# Patient Record
Sex: FEMALE | Race: WHITE | NOT HISPANIC OR LATINO | Employment: UNEMPLOYED | URBAN - METROPOLITAN AREA
[De-identification: names, ages, dates, MRNs, and addresses within clinical notes are randomized per-mention and may not be internally consistent; named-entity substitution may affect disease eponyms.]

---

## 2021-07-23 ENCOUNTER — APPOINTMENT (OUTPATIENT)
Dept: GENERAL RADIOLOGY | Facility: HOSPITAL | Age: 36
End: 2021-07-23

## 2021-07-23 ENCOUNTER — HOSPITAL ENCOUNTER (EMERGENCY)
Facility: HOSPITAL | Age: 36
Discharge: HOME | End: 2021-07-23
Attending: EMERGENCY MEDICINE | Admitting: EMERGENCY MEDICINE

## 2021-07-23 VITALS
OXYGEN SATURATION: 100 % | DIASTOLIC BLOOD PRESSURE: 71 MMHG | BODY MASS INDEX: 32.17 KG/M2 | HEART RATE: 74 BPM | RESPIRATION RATE: 20 BRPM | SYSTOLIC BLOOD PRESSURE: 116 MMHG | TEMPERATURE: 98.4 F | HEIGHT: 66 IN | WEIGHT: 200.18 LBS

## 2021-07-23 DIAGNOSIS — R20.2 PARESTHESIA: Primary | ICD-10-CM

## 2021-07-23 LAB
ALBUMIN SERPL-MCNC: 3.9 G/DL (ref 3.5–5.2)
ALBUMIN/GLOB SERPL: 1.1 G/DL
ALP SERPL-CCNC: 72 U/L (ref 39–117)
ALT SERPL W P-5'-P-CCNC: 16 U/L (ref 1–33)
ANION GAP SERPL CALCULATED.3IONS-SCNC: 9.7 MMOL/L (ref 5–15)
AST SERPL-CCNC: 17 U/L (ref 1–32)
BASOPHILS # BLD AUTO: 0.04 10*3/MM3 (ref 0–0.2)
BASOPHILS NFR BLD AUTO: 0.4 % (ref 0–1.5)
BILIRUB SERPL-MCNC: 0.3 MG/DL (ref 0–1.2)
BUN SERPL-MCNC: 8 MG/DL (ref 6–20)
BUN/CREAT SERPL: 12.9 (ref 7–25)
CALCIUM SPEC-SCNC: 9.1 MG/DL (ref 8.6–10.5)
CHLORIDE SERPL-SCNC: 105 MMOL/L (ref 98–107)
CO2 SERPL-SCNC: 21.3 MMOL/L (ref 22–29)
CREAT SERPL-MCNC: 0.62 MG/DL (ref 0.57–1)
DEPRECATED RDW RBC AUTO: 42.5 FL (ref 37–54)
EOSINOPHIL # BLD AUTO: 0.3 10*3/MM3 (ref 0–0.4)
EOSINOPHIL NFR BLD AUTO: 3 % (ref 0.3–6.2)
ERYTHROCYTE [DISTWIDTH] IN BLOOD BY AUTOMATED COUNT: 12.4 % (ref 12.3–15.4)
GFR SERPL CREATININE-BSD FRML MDRD: 108 ML/MIN/1.73
GLOBULIN UR ELPH-MCNC: 3.5 GM/DL
GLUCOSE SERPL-MCNC: 102 MG/DL (ref 65–99)
HCT VFR BLD AUTO: 40 % (ref 34–46.6)
HGB BLD-MCNC: 13.9 G/DL (ref 12–15.9)
HOLD SPECIMEN: NORMAL
HOLD SPECIMEN: NORMAL
IMM GRANULOCYTES # BLD AUTO: 0.03 10*3/MM3 (ref 0–0.05)
IMM GRANULOCYTES NFR BLD AUTO: 0.3 % (ref 0–0.5)
LYMPHOCYTES # BLD AUTO: 1.75 10*3/MM3 (ref 0.7–3.1)
LYMPHOCYTES NFR BLD AUTO: 17.7 % (ref 19.6–45.3)
MCH RBC QN AUTO: 32.6 PG (ref 26.6–33)
MCHC RBC AUTO-ENTMCNC: 34.8 G/DL (ref 31.5–35.7)
MCV RBC AUTO: 93.7 FL (ref 79–97)
MONOCYTES # BLD AUTO: 0.67 10*3/MM3 (ref 0.1–0.9)
MONOCYTES NFR BLD AUTO: 6.8 % (ref 5–12)
NEUTROPHILS NFR BLD AUTO: 7.07 10*3/MM3 (ref 1.7–7)
NEUTROPHILS NFR BLD AUTO: 71.8 % (ref 42.7–76)
NRBC BLD AUTO-RTO: 0 /100 WBC (ref 0–0.2)
NT-PROBNP SERPL-MCNC: 71.2 PG/ML (ref 0–450)
PLATELET # BLD AUTO: 336 10*3/MM3 (ref 140–450)
PMV BLD AUTO: 8.5 FL (ref 6–12)
POTASSIUM SERPL-SCNC: 3.8 MMOL/L (ref 3.5–5.2)
PROT SERPL-MCNC: 7.4 G/DL (ref 6–8.5)
RBC # BLD AUTO: 4.27 10*6/MM3 (ref 3.77–5.28)
SODIUM SERPL-SCNC: 136 MMOL/L (ref 136–145)
TROPONIN T SERPL-MCNC: <0.01 NG/ML (ref 0–0.03)
WBC # BLD AUTO: 9.86 10*3/MM3 (ref 3.4–10.8)
WHOLE BLOOD HOLD SPECIMEN: NORMAL

## 2021-07-23 PROCEDURE — 71045 X-RAY EXAM CHEST 1 VIEW: CPT

## 2021-07-23 PROCEDURE — 93005 ELECTROCARDIOGRAM TRACING: CPT | Performed by: EMERGENCY MEDICINE

## 2021-07-23 PROCEDURE — 93010 ELECTROCARDIOGRAM REPORT: CPT | Performed by: INTERNAL MEDICINE

## 2021-07-23 PROCEDURE — 85025 COMPLETE CBC W/AUTO DIFF WBC: CPT | Performed by: EMERGENCY MEDICINE

## 2021-07-23 PROCEDURE — 99284 EMERGENCY DEPT VISIT MOD MDM: CPT

## 2021-07-23 PROCEDURE — 83880 ASSAY OF NATRIURETIC PEPTIDE: CPT | Performed by: EMERGENCY MEDICINE

## 2021-07-23 PROCEDURE — 80053 COMPREHEN METABOLIC PANEL: CPT | Performed by: EMERGENCY MEDICINE

## 2021-07-23 PROCEDURE — 84484 ASSAY OF TROPONIN QUANT: CPT | Performed by: EMERGENCY MEDICINE

## 2021-07-23 PROCEDURE — 96374 THER/PROPH/DIAG INJ IV PUSH: CPT

## 2021-07-23 RX ORDER — FAMOTIDINE 10 MG/ML
20 INJECTION, SOLUTION INTRAVENOUS ONCE
Status: COMPLETED | OUTPATIENT
Start: 2021-07-23 | End: 2021-07-23

## 2021-07-23 RX ORDER — FAMOTIDINE 40 MG/1
40 TABLET, FILM COATED ORAL DAILY
Qty: 10 TABLET | Refills: 0 | Status: SHIPPED | OUTPATIENT
Start: 2021-07-23 | End: 2021-09-03 | Stop reason: SDUPTHER

## 2021-07-23 RX ORDER — SODIUM CHLORIDE 0.9 % (FLUSH) 0.9 %
10 SYRINGE (ML) INJECTION AS NEEDED
Status: DISCONTINUED | OUTPATIENT
Start: 2021-07-23 | End: 2021-07-24 | Stop reason: HOSPADM

## 2021-07-23 RX ADMIN — FAMOTIDINE 20 MG: 10 INJECTION INTRAVENOUS at 22:42

## 2021-07-24 LAB — QT INTERVAL: 368 MS

## 2021-07-24 NOTE — ED PROVIDER NOTES
Time: 10:28 PM EDT  Arrived by: private car  Chief Complaint:   Chief Complaint   Patient presents with   • Shortness of Breath     shortness of air,light headedness and tingling for 2 weeks in all her extremitities, when she is standing or sitting a certain way     History provided by: Patient  History is limited by: N/A     History of Present Illness:  Patient is a 36 y.o. year old female that presents to the emergency department with chest pain and intermittent tingling    Patient states since she was treated for poison ivy with prednisone several weeks ago she began developing intermittent episodes of chest pain/tightness awakens her from sleep at night.  It occasionally radiates into her neck and describes tingling over her whole body that occurs intermittently.  Occasionally she has shortness of breath and mild abdominal pain.  She is currently asymptomatic.      History provided by:  Patient  Chest Pain  Pain location:  Substernal area and epigastric  Pain quality: aching    Pain radiates to:  Neck  Pain severity:  No pain  Onset quality:  Gradual  Timing:  Intermittent  Progression:  Unable to specify  Chronicity:  New  Context: at rest (awakens from sleep)    Relieved by:  Nothing  Worsened by:  Nothing  Ineffective treatments:  None tried  Associated symptoms: heartburn, nausea, numbness and vomiting    Associated symptoms: no abdominal pain, no cough, no fever, no headache and no shortness of breath        Similar Symptoms Previously: no  Recently seen: no      Patient Care Team  Primary Care Provider: Provider, No Known    Past Medical History:     No Known Allergies  History reviewed. No pertinent past medical history.  History reviewed. No pertinent surgical history.  History reviewed. No pertinent family history.    Home Medications:  Prior to Admission medications    Not on File        Social History:   Social History     Tobacco Use   • Smoking status: Never Smoker   • Smokeless tobacco: Never Used  "  Vaping Use   • Vaping Use: Some days   • Substances: Nicotine   • Devices: Disposable   Substance Use Topics   • Alcohol use: Yes     Comment: socially   • Drug use: Never     Recent travel: no     Record Review:  I have reviewed the patient's records in [facility].     Review of Systems:  Review of Systems   Constitutional: Negative for chills and fever.   HENT: Negative for congestion, ear pain and sore throat.    Eyes: Negative for pain.   Respiratory: Negative for cough, chest tightness and shortness of breath.    Cardiovascular: Positive for chest pain.   Gastrointestinal: Positive for heartburn, nausea and vomiting. Negative for abdominal pain and diarrhea.   Genitourinary: Negative for flank pain and hematuria.   Musculoskeletal: Negative for joint swelling.   Skin: Negative for pallor.   Neurological: Positive for numbness. Negative for seizures and headaches.   All other systems reviewed and are negative.       Physical Exam:  /77   Pulse 69   Temp 98.4 °F (36.9 °C) (Oral)   Resp 23   Ht 167.6 cm (66\")   Wt 90.8 kg (200 lb 2.8 oz)   LMP 06/30/2021 (Exact Date)   SpO2 99%   Breastfeeding No   BMI 32.31 kg/m²     Physical Exam  Vitals and nursing note reviewed.   Constitutional:       General: She is not in acute distress.     Appearance: Normal appearance. She is not toxic-appearing.   HENT:      Head: Normocephalic and atraumatic.      Jaw: There is normal jaw occlusion.   Eyes:      General: Lids are normal.      Extraocular Movements: Extraocular movements intact.      Conjunctiva/sclera: Conjunctivae normal.      Pupils: Pupils are equal, round, and reactive to light.   Cardiovascular:      Rate and Rhythm: Normal rate and regular rhythm.      Pulses: Normal pulses.      Heart sounds: Normal heart sounds.   Pulmonary:      Effort: Pulmonary effort is normal. No respiratory distress.      Breath sounds: Normal breath sounds. No wheezing or rhonchi.   Abdominal:      General: Abdomen is flat.    "   Palpations: Abdomen is soft.      Tenderness: There is no abdominal tenderness. There is no guarding or rebound.   Musculoskeletal:         General: Normal range of motion.      Cervical back: Normal range of motion and neck supple.      Right lower leg: No edema.      Left lower leg: No edema.   Skin:     General: Skin is warm and dry.   Neurological:      Mental Status: She is alert and oriented to person, place, and time. Mental status is at baseline.   Psychiatric:         Mood and Affect: Mood normal.                Medications in the Emergency Department:  Medications   sodium chloride 0.9 % flush 10 mL (has no administration in time range)   famotidine (PEPCID) injection 20 mg (20 mg Intravenous Given 7/23/21 2242)        Labs  Lab Results (last 24 hours)     Procedure Component Value Units Date/Time    CBC & Differential [[id number]]  (Abnormal) Collected: 07/23/21 2010    Specimen: Blood Updated: 07/23/21 2019    Narrative:      The following orders were created for panel order CBC & Differential.  Procedure                               Abnormality         Status                     ---------                               -----------         ------                     CBC Auto Differential[[id number]]        Abnormal            Final result                 Please view results for these tests on the individual orders.    Comprehensive Metabolic Panel [[id number]]  (Abnormal) Collected: 07/23/21 2010    Specimen: Blood Updated: 07/23/21 2039     Glucose 102 mg/dL      BUN 8 mg/dL      Creatinine 0.62 mg/dL      Sodium 136 mmol/L      Potassium 3.8 mmol/L      Chloride 105 mmol/L      CO2 21.3 mmol/L      Calcium 9.1 mg/dL      Total Protein 7.4 g/dL      Albumin 3.90 g/dL      ALT (SGPT) 16 U/L      AST (SGOT) 17 U/L      Alkaline Phosphatase 72 U/L      Total Bilirubin 0.3 mg/dL      eGFR Non African Amer 108 mL/min/1.73      Globulin 3.5 gm/dL      A/G Ratio 1.1 g/dL      BUN/Creatinine Ratio 12.9     Anion  Gap 9.7 mmol/L     Narrative:      GFR Normal >60  Chronic Kidney Disease <60  Kidney Failure <15      BNP [[id number]]  (Normal) Collected: 07/23/21 2010    Specimen: Blood Updated: 07/23/21 2038     proBNP 71.2 pg/mL     Narrative:      Among patients with dyspnea, NT-proBNP is highly sensitive for the detection of acute congestive heart failure. In addition NT-proBNP of <300 pg/ml effectively rules out acute congestive heart failure with 99% negative predictive value.    Results may be falsely decreased if patient taking Biotin.      Troponin [[id number]]  (Normal) Collected: 07/23/21 2010    Specimen: Blood Updated: 07/23/21 2039     Troponin T <0.010 ng/mL     Narrative:      Troponin T Reference Range:  <= 0.03 ng/mL-   Negative for AMI  >0.03 ng/mL-     Abnormal for myocardial necrosis.  Clinicians would have to utilize clinical acumen, EKG, Troponin and serial changes to determine if it is an Acute Myocardial Infarction or myocardial injury due to an underlying chronic condition.       Results may be falsely decreased if patient taking Biotin.      CBC Auto Differential [[id number]]  (Abnormal) Collected: 07/23/21 2010    Specimen: Blood Updated: 07/23/21 2019     WBC 9.86 10*3/mm3      RBC 4.27 10*6/mm3      Hemoglobin 13.9 g/dL      Hematocrit 40.0 %      MCV 93.7 fL      MCH 32.6 pg      MCHC 34.8 g/dL      RDW 12.4 %      RDW-SD 42.5 fl      MPV 8.5 fL      Platelets 336 10*3/mm3      Neutrophil % 71.8 %      Lymphocyte % 17.7 %      Monocyte % 6.8 %      Eosinophil % 3.0 %      Basophil % 0.4 %      Immature Grans % 0.3 %      Neutrophils, Absolute 7.07 10*3/mm3      Lymphocytes, Absolute 1.75 10*3/mm3      Monocytes, Absolute 0.67 10*3/mm3      Eosinophils, Absolute 0.30 10*3/mm3      Basophils, Absolute 0.04 10*3/mm3      Immature Grans, Absolute 0.03 10*3/mm3      nRBC 0.0 /100 WBC            Imaging:  XR Chest 1 View    Result Date: 7/23/2021  PROCEDURE: XR CHEST 1 VW  COMPARISON: None.   INDICATIONS: SHORT OF BREATH, CHEST HEAVINESS.  FINDINGS: A single frontal chest radiograph reveals no cardiac enlargement and no acute infiltrate. No pneumothorax is seen.  CONCLUSION: No acute cardiopulmonary disease is seen radiographically.       LAURA DANIELSON JR, MD       Electronically Signed and Approved By: LAURA DANIELSON JR, MD on 7/23/2021 at 20:40               Procedures:  Procedures    Progress  ED Course as of Jul 23 2316 Fri Jul 23, 2021 2232 EKG: normal EKG, normal sinus rhythm, there are no previous tracings available for comparison, 83, normal P wave, QRS, ST segment, and QT interval.      [JS]      ED Course User Index  [JS] Cristian Willis MD                            Medical Decision Making:  MDM  Number of Diagnoses or Management Options  Paresthesia: new and requires workup  Diagnosis management comments: On reevaluation the patient states her main concern is her whole body intermittent paresthesias.  She has a normal nonfocal neurologic exam.  The patient´s CMP was reviewed and shows no abnormalities of critical concern. Of note, the patient´s sodium and potassium are acceptable. The patient´s liver enzymes are unremarkable. The patient´s renal function (creatinine) is preserved. The patient has a normal anion gap the patient´s CBC was reviewed and shows no abnormalities of critical concern. Of note, there is no anemia requiring a blood transfusion and the platelet count is acceptable.  Encouraged her to follow-up with neurology.  She has no evidence of neuropathy vascular compromise or neurologic deficit.  We discussed return precautions including worsening symptoms or any additional concerns.       Amount and/or Complexity of Data Reviewed  Clinical lab tests: reviewed  Tests in the radiology section of CPT®: reviewed  Tests in the medicine section of CPT®: reviewed         Final diagnoses:   Paresthesia        Disposition:  ED Disposition     ED Disposition Condition Comment     Discharge Stable           Documentation assistance provided by Cristian Willis MD acting as scribe for Cristian Willis MD. Information recorded by the scribe was done at my direction and has been verified and validated by me.          Cristian Willis MD  07/24/21 [id number]

## 2021-07-30 ENCOUNTER — NURSE TRIAGE (OUTPATIENT)
Dept: CALL CENTER | Facility: HOSPITAL | Age: 36
End: 2021-07-30

## 2021-07-30 NOTE — TELEPHONE ENCOUNTER
Call  From the [facility]. Caller has tingling and numbness with some soa at times.  Has been evaluated for these symptoms, needing a follow up. Symptoms are improving. Afraid she has a serious illiness. Referred back to the [facility] for an appointment    Reason for Disposition  • [1] Numbness or tingling on both sides of body AND [2] is a new symptom present > 24 hours    Additional Information  • Negative: [1] SEVERE weakness (i.e., unable to walk or barely able to walk, requires support) AND [2] new-onset or worsening  • Negative: [1] Weakness (i.e., paralysis, loss of muscle strength) of the face, arm / hand, or leg / foot on one side of the body AND [2] sudden onset AND [3] present now (Exception: Bell's palsy suspected [i.e., weakness only one side of the face, developing over hours to days, no other symptoms])  • Negative: [1] Numbness (i.e., loss of sensation) of the face, arm / hand, or leg / foot on one side of the body AND [2] sudden onset AND [3] present now  • Negative: [1] Loss of speech or garbled speech AND [2] sudden onset AND [3] present now  • Negative: Difficult to awaken or acting confused (e.g., disoriented, slurred speech)  • Negative: Sounds like a life-threatening emergency to the triager  • Negative: Confusion, disorientation, or hallucinations is main symptom  • Negative: Neck pain is main symptom (and having weakness, numbness, or tingling in arm / hand because of neck pain)  • Negative: Back pain is main symptom (and having weakness, numbness, or tingling in leg because of back pain)  • Negative: Hand pain is main symptom (and having mild weakness, numbness, or tingling in hand related to hand pain)  • Negative: Dizziness is main symptom  • Negative: Vision loss or change is main symptom  • Negative: Followed a head injury within last 3 days  • Negative: Followed a neck injury within last 3 days  • Negative: [1] Tingling in both hands and/or feet AND [2] breathing faster than normal AND [3] feels  similar to prior panic attack or hyperventilation episode  • Negative: Weakness in both sides of the body or weakness all over  • Negative: Headache  (and neurologic deficit)  • Negative: [1] Back pain AND [2] numbness (loss of sensation) in groin or rectal area  • Negative: [1] Unable to urinate (or only a few drops) > 4 hours AND [2] bladder feels very full (e.g., palpable bladder or strong urge to urinate)  • Negative: [1] Loss of bladder or bowel control (urine or bowel incontinence; wetting self, leaking stool) AND [2] new-onset  • Negative: [1] Weakness (i.e., paralysis, loss of muscle strength) of the face, arm / hand, or leg / foot on one side of the body AND [2] sudden onset AND [3] brief (now gone)  • Negative: [1] Numbness (i.e., loss of sensation) of the face, arm / hand, or leg / foot on one side of the body AND [2] sudden onset AND [3] brief (now gone)  • Negative: [1] Loss of speech or garbled speech AND [2] sudden onset AND [3] brief (now gone)  • Negative: Bell's palsy suspected (i.e., weakness on only one side of the face, developing over hours to days, no other symptoms)  • Negative: Patient sounds very sick or weak to the triager  • Negative: Neck pain (and neurologic deficit)  • Negative: Back pain (and neurologic deficit)  • Negative: [1] Weakness of the face, arm / hand, or leg / foot on one side of the body AND [2] gradual onset (e.g., days to weeks) AND [3] present now  • Negative: [1] Numbness (i.e., loss of sensation) of the face, arm / hand, or leg / foot on one side of the body AND [2] gradual onset (e.g., days to weeks) AND [3] present now  • Negative: [1] Loss of speech or garbled speech AND [2] gradual onset (e.g., days to weeks) AND [3] present now  • Negative: [1] Tingling (e.g., pins and needles) of the face, arm / hand, or leg / foot on one side of the body AND [2] present now (Exceptions: chronic/recurrent symptom lasting > 4 weeks or tingling from known cause, such as: bumped  "elbow, carpal tunnel syndrome, pinched nerve, frostbite)  • Negative: [1] Loss of speech or garbled speech AND [2] is a chronic symptom (recurrent or ongoing AND present > 4 weeks)  • Negative: [1] Weakness of arm / hand, or leg / foot AND [2] is a chronic symptom (recurrent or ongoing AND present > 4 weeks)  • Negative: [1] Numbness or tingling in one or both hands AND [2] is a chronic symptom (recurrent or ongoing AND present > 4 weeks)  • Negative: [1] Numbness or tingling in one or both feet AND [2] is a chronic symptom (recurrent or ongoing AND present > 4 weeks)    Answer Assessment - Initial Assessment Questions  1. SYMPTOM: \"What is the main symptom you are concerned about?\" (e.g., weakness, numbness)      Tingling and numbness  2. ONSET: \"When did this start?\" (minutes, hours, days; while sleeping)      Symptoms started 2-3 weeks ago  3. LAST NORMAL: \"When was the last time you were normal (no symptoms)?\"      2-3 weeks ago  4. PATTERN \"Does this come and go, or has it been constant since it started?\"  \"Is it present now?\"       no  5. CARDIAC SYMPTOMS: \"Have you had any of the following symptoms: chest pain, difficulty breathing, palpitations?\"      no  6. NEUROLOGIC SYMPTOMS: \"Have you had any of the following symptoms: headache, dizziness, vision loss, double vision, changes in speech, unsteady on your feet?\"      no  7. OTHER SYMPTOMS: \"Do you have any other symptoms?\"      Chest tightness  8. PREGNANCY: \"Is there any chance you are pregnant?\" \"When was your last menstrual period?\"      na    Protocols used: NEUROLOGIC DEFICIT-ADULT-AH      "

## 2021-08-03 ENCOUNTER — OFFICE VISIT (OUTPATIENT)
Dept: FAMILY MEDICINE CLINIC | Facility: CLINIC | Age: 36
End: 2021-08-03

## 2021-08-03 VITALS
WEIGHT: 199.2 LBS | HEART RATE: 103 BPM | HEIGHT: 66 IN | DIASTOLIC BLOOD PRESSURE: 72 MMHG | BODY MASS INDEX: 32.02 KG/M2 | OXYGEN SATURATION: 97 % | SYSTOLIC BLOOD PRESSURE: 124 MMHG | TEMPERATURE: 97 F

## 2021-08-03 DIAGNOSIS — Z13.220 SCREENING FOR LIPID DISORDERS: ICD-10-CM

## 2021-08-03 DIAGNOSIS — F32.A DEPRESSION, UNSPECIFIED DEPRESSION TYPE: ICD-10-CM

## 2021-08-03 DIAGNOSIS — R20.2 PARESTHESIA: Primary | ICD-10-CM

## 2021-08-03 DIAGNOSIS — F41.9 ANXIETY: ICD-10-CM

## 2021-08-03 DIAGNOSIS — R73.9 ELEVATED BLOOD SUGAR: ICD-10-CM

## 2021-08-03 LAB
25(OH)D3 SERPL-MCNC: 31.7 NG/ML
CHOLEST SERPL-MCNC: 185 MG/DL (ref 0–200)
FOLATE SERPL-MCNC: 16.3 NG/ML (ref 4.78–24.2)
HDLC SERPL-MCNC: 44 MG/DL (ref 40–60)
LDLC SERPL CALC-MCNC: 111 MG/DL (ref 0–100)
LDLC/HDLC SERPL: 2.43 {RATIO}
T4 FREE SERPL-MCNC: 1.45 NG/DL (ref 0.93–1.7)
TRIGL SERPL-MCNC: 170 MG/DL (ref 0–150)
TSH SERPL DL<=0.05 MIU/L-ACNC: 0.94 UIU/ML (ref 0.27–4.2)
VIT B12 BLD-MCNC: 897 PG/ML (ref 211–946)
VLDLC SERPL-MCNC: 30 MG/DL (ref 5–40)

## 2021-08-03 PROCEDURE — 82746 ASSAY OF FOLIC ACID SERUM: CPT | Performed by: NURSE PRACTITIONER

## 2021-08-03 PROCEDURE — 80061 LIPID PANEL: CPT | Performed by: NURSE PRACTITIONER

## 2021-08-03 PROCEDURE — 99214 OFFICE O/P EST MOD 30 MIN: CPT | Performed by: NURSE PRACTITIONER

## 2021-08-03 PROCEDURE — 84439 ASSAY OF FREE THYROXINE: CPT | Performed by: NURSE PRACTITIONER

## 2021-08-03 PROCEDURE — 82607 VITAMIN B-12: CPT | Performed by: NURSE PRACTITIONER

## 2021-08-03 PROCEDURE — 82306 VITAMIN D 25 HYDROXY: CPT | Performed by: NURSE PRACTITIONER

## 2021-08-03 PROCEDURE — 83036 HEMOGLOBIN GLYCOSYLATED A1C: CPT | Performed by: NURSE PRACTITIONER

## 2021-08-03 PROCEDURE — 84443 ASSAY THYROID STIM HORMONE: CPT | Performed by: NURSE PRACTITIONER

## 2021-08-03 RX ORDER — LANOLIN ALCOHOL/MO/W.PET/CERES
1000 CREAM (GRAM) TOPICAL DAILY
COMMUNITY

## 2021-08-03 RX ORDER — MELATONIN
1000 DAILY
COMMUNITY

## 2021-08-03 NOTE — PROGRESS NOTES
Chief Complaint  Establish Care, Numbness, and Shortness of Breath    Subjective          Kira Godwin presents to [facility]  History of Present Illness  Presents today to establish care and with concerns of paresthesia and shortness of breath.  No previous PCP. She went to [facility] on July 2, 2021 for poison ivy dermatitis.  She was prescribed a Medrol Dosepak.  On July 16 she went back to [facility] with concerns of paresthesia and shortness of breath.  [facility] mentions they had concerns that her symptoms were most likely related to anxiety.  She declined going to the emergency department.  On July 23, 2021 she went to the emergency department for the paresthesia.  She reports that she has been having whole body intermittent paresthesia.  Today she expresses primarily in her lower extremities.  She reports today that the symptoms have improved.  At the hospital her troponins BNP and labs were all negative except for she had an elevated glucose of 102.  She denies fever and chills.    Her PHQ-9 today was 10 her ANUP-7 score was 11.  She states that she has some depression anxiety that are caused by situations.  She declines at this time any medication or psychotherapy.  She went purchased several over-the-counter herbal medications which she thought would help her numbness and tingling.    She lives with her boyfriend and has no children.  She reports she has been nervous about leaving the house due to her numbness and tingling.  She denies coughing and wheezing but reports some shortness of breath that is positional.    Social History     Socioeconomic History   • Marital status: Single     Spouse name: Not on file   • Number of children: Not on file   • Years of education: Not on file   • Highest education level: Not on file   Tobacco Use   • Smoking status: Never Smoker   • Smokeless tobacco: Never Used   Vaping Use   • Vaping Use: Some days   • Substances: Nicotine   •  "Devices: Disposable   Substance and Sexual Activity   • Alcohol use: Yes     Comment: socially   • Drug use: Never   • Sexual activity: Defer       Objective   Vital Signs:   /72 (BP Location: Right arm, Patient Position: Sitting, Cuff Size: Adult)   Pulse 103   Temp 97 °F (36.1 °C) (Temporal)   Ht 167.6 cm (66\")   Wt 90.4 kg (199 lb 3.2 oz)   SpO2 97%   BMI 32.15 kg/m²     Physical Exam  Vitals reviewed.   Constitutional:       Appearance: Normal appearance. She is well-developed. She is obese.   HENT:      Head: Normocephalic and atraumatic.      Right Ear: External ear normal.      Left Ear: External ear normal.      Mouth/Throat:      Pharynx: No oropharyngeal exudate.   Eyes:      Conjunctiva/sclera: Conjunctivae normal.      Pupils: Pupils are equal, round, and reactive to light.   Cardiovascular:      Rate and Rhythm: Normal rate and regular rhythm.      Heart sounds: No murmur heard.   No friction rub. No gallop.    Pulmonary:      Effort: Pulmonary effort is normal.      Breath sounds: Normal breath sounds. No wheezing or rhonchi.   Abdominal:      General: Bowel sounds are normal. There is no distension.      Palpations: Abdomen is soft.      Tenderness: There is no abdominal tenderness.   Skin:     General: Skin is warm and dry.   Neurological:      Mental Status: She is alert and oriented to person, place, and time.      Cranial Nerves: No cranial nerve deficit.   Psychiatric:         Mood and Affect: Affect normal. Mood is anxious. Mood is not depressed. Affect is not flat, angry or tearful.         Behavior: Behavior normal.         Thought Content: Thought content normal. Thought content is not paranoid or delusional. Thought content does not include homicidal or suicidal ideation. Thought content does not include homicidal or suicidal plan.         Cognition and Memory: Cognition and memory normal.         Judgment: Judgment normal.        Result Review :     Common labs    Common Labsle " 7/23/21 7/23/21 2010 2010   Glucose  102 (A)   BUN  8   Creatinine  0.62   eGFR Non African Am  108   Sodium  136   Potassium  3.8   Chloride  105   Calcium  9.1   Albumin  3.90   Total Bilirubin  0.3   Alkaline Phosphatase  72   AST (SGOT)  17   ALT (SGPT)  16   WBC 9.86    Hemoglobin 13.9    Hematocrit 40.0    Platelets 336    (A) Abnormal value                      Assessment and Plan    Diagnoses and all orders for this visit:    1. Paresthesia (Primary)  Assessment & Plan:  Check B12, vitamin D, TSH and free T4.  Reviewed care first and ED notes and labs.    Orders:  -     Vitamin B12 & Folate  -     TSH  -     T4, Free  -     Vitamin D 25 Hydroxy    2. Depression, unspecified depression type  Assessment & Plan:  Positive screening for depression.  At this time she declines medication and counseling.      3. Anxiety    4. Elevated blood sugar  Comments:  Blood sugar slightly elevated.  Check hemoglobin A1c for elevated blood sugar and polydipsia  Orders:  -     Hemoglobin A1c    5. Screening for lipid disorders  -     Lipid Panel      Follow Up {Instructions Charge Capture  Follow-up Communications :23}  Return in about 4 weeks (around 8/31/2021) for Next scheduled follow up.  Patient was given instructions and counseling regarding her condition or for health maintenance advice. Please see specific information pulled into the AVS if appropriate.

## 2021-08-04 LAB — HBA1C MFR BLD: 5.3 % (ref 4.8–5.6)

## 2021-08-28 ENCOUNTER — DOCUMENTATION (OUTPATIENT)
Dept: FAMILY MEDICINE CLINIC | Facility: CLINIC | Age: 36
End: 2021-08-28

## 2021-08-28 NOTE — PROGRESS NOTES
"I was contacted during the on-call service by the patient with c/o \"Condition getting worse from Nerve damage\". Patient reports that this started when she was taking prednisone recently. States that she has nerve patient all over her body. She was evaluated in our office and was negative for b12 deficiency and negative for diabetes. She has headaches sometimes. Distant cousin with MS. Patient has appointment with Saeed Strong on 9/3/2021. I encouraged her to keep appointment. I would find it reasonable to evaluate the brain with CT or MRI to rule in/out any CNS cause. She was requesting more urgent evaluation which I told her that she could go to the ER to have same day evaluation or call early next week to try and get in sooner. Patient verbalized understanding.  "

## 2021-09-02 ENCOUNTER — HOSPITAL ENCOUNTER (OUTPATIENT)
Dept: GENERAL RADIOLOGY | Facility: HOSPITAL | Age: 36
Discharge: HOME | End: 2021-09-02
Admitting: NURSE PRACTITIONER

## 2021-09-02 ENCOUNTER — TELEPHONE (OUTPATIENT)
Dept: FAMILY MEDICINE CLINIC | Facility: CLINIC | Age: 36
End: 2021-09-02

## 2021-09-02 ENCOUNTER — OFFICE VISIT (OUTPATIENT)
Dept: FAMILY MEDICINE CLINIC | Facility: CLINIC | Age: 36
End: 2021-09-02

## 2021-09-02 VITALS
WEIGHT: 194.2 LBS | TEMPERATURE: 97.9 F | SYSTOLIC BLOOD PRESSURE: 118 MMHG | HEIGHT: 66 IN | BODY MASS INDEX: 31.21 KG/M2 | OXYGEN SATURATION: 100 % | DIASTOLIC BLOOD PRESSURE: 80 MMHG | HEART RATE: 72 BPM

## 2021-09-02 DIAGNOSIS — R20.2 PARESTHESIA: Primary | ICD-10-CM

## 2021-09-02 DIAGNOSIS — M54.2 NECK PAIN: ICD-10-CM

## 2021-09-02 DIAGNOSIS — F41.9 ANXIETY: ICD-10-CM

## 2021-09-02 PROCEDURE — 99213 OFFICE O/P EST LOW 20 MIN: CPT | Performed by: NURSE PRACTITIONER

## 2021-09-02 PROCEDURE — 72050 X-RAY EXAM NECK SPINE 4/5VWS: CPT

## 2021-09-02 PROCEDURE — 72050 X-RAY EXAM NECK SPINE 4/5VWS: CPT | Performed by: RADIOLOGY

## 2021-09-02 NOTE — PROGRESS NOTES
"Chief Complaint  Paresthesia of all extremites, neck stiffness    Subjective          Kira Godwin presents to [facility]  History of Present Illness  Presents today for a 1 month follow up on paresthesia of all extremities. She feels the numbness has worsen and is intermittently.  She states that she has some neck stiffness and neck pain.  She reports her face felt swollen with a burning sensation.  Also reports numbness tingling in her upper and lower extremities.  She also reports mid back pain.  She states she has very large breasts and that the weight causes her upper back pain.  Objective   Vital Signs:   /80   Pulse 72   Temp 97.9 °F (36.6 °C)   Ht 167.6 cm (66\")   Wt 88.1 kg (194 lb 3.2 oz)   SpO2 100%   BMI 31.34 kg/m²     Physical Exam  Vitals reviewed.   Constitutional:       Appearance: Normal appearance. She is well-developed.   HENT:      Head: Normocephalic and atraumatic.      Right Ear: External ear normal.      Left Ear: External ear normal.      Mouth/Throat:      Pharynx: No oropharyngeal exudate.   Eyes:      Conjunctiva/sclera: Conjunctivae normal.      Pupils: Pupils are equal, round, and reactive to light.   Cardiovascular:      Rate and Rhythm: Normal rate and regular rhythm.      Heart sounds: No murmur heard.   No friction rub. No gallop.    Pulmonary:      Effort: Pulmonary effort is normal.      Breath sounds: Normal breath sounds. No wheezing or rhonchi.   Abdominal:      General: Bowel sounds are normal. There is no distension.      Palpations: Abdomen is soft.      Tenderness: There is no abdominal tenderness.   Skin:     General: Skin is warm and dry.   Neurological:      Mental Status: She is alert and oriented to person, place, and time.      Cranial Nerves: No cranial nerve deficit.   Psychiatric:         Mood and Affect: Affect normal. Mood is anxious.         Behavior: Behavior normal.         Thought Content: Thought content " normal.         Judgment: Judgment normal.        Result Review :                 Assessment and Plan    Diagnoses and all orders for this visit:    1. Paresthesia (Primary)  Assessment & Plan:  Vitamin B12, hemoglobin A1c, and other labs are within normal limits.  We will order an MRI brain with and without contrast for generalized paresthesia of face and body.    Orders:  -     MRI Brain With & Without Contrast; Future    2. Neck pain  Comments:  Order x-ray of cervical spine.  Orders:  -     XR Spine Cervical Complete 4 or 5 View; Future    3. Anxiety  Assessment & Plan:  Mary with patient that there is a medication for her depression anxiety and neuropathic pain duloxetine.  Patient declines medication at this time.  She appeared very anxious throughout visit.        Follow Up   Return if symptoms worsen or fail to improve, for Next scheduled follow upAfter MRI and neck x-ray.  Patient was given instructions and counseling regarding her condition or for health maintenance advice. Please see specific information pulled into the AVS if appropriate.

## 2021-09-02 NOTE — ASSESSMENT & PLAN NOTE
Vitamin B12, hemoglobin A1c, and other labs are within normal limits.  We will order an MRI brain with and without contrast for generalized paresthesia of face and body.

## 2021-09-02 NOTE — ASSESSMENT & PLAN NOTE
Discussed with patient that there is a medication for her depression anxiety and neuropathic pain duloxetine.  Patient declines medication at this time.  She appeared very anxious throughout visit.

## 2021-09-03 ENCOUNTER — TELEPHONE (OUTPATIENT)
Dept: FAMILY MEDICINE CLINIC | Facility: CLINIC | Age: 36
End: 2021-09-03

## 2021-09-03 RX ORDER — FAMOTIDINE 40 MG/1
40 TABLET, FILM COATED ORAL DAILY
Qty: 90 TABLET | Refills: 0 | Status: SHIPPED | OUTPATIENT
Start: 2021-09-03 | End: 2022-03-23

## 2021-09-03 NOTE — TELEPHONE ENCOUNTER
Caller: Kira Godwin    Relationship: Self    Best call back number: [phone]    What is the best time to reach you: ANY    Who are you requesting to speak with (clinical staff, provider,  specific staff member): CLINICAL STAFF    What was the call regarding: PATIENT WOULD LIKE TO SPEAK WITH A NURSE REGARDING HER IMAGING RESULTS.    Do you require a callback: YES

## 2021-09-14 ENCOUNTER — OFFICE VISIT (OUTPATIENT)
Dept: FAMILY MEDICINE CLINIC | Facility: CLINIC | Age: 36
End: 2021-09-14

## 2021-09-14 VITALS
BODY MASS INDEX: 31.37 KG/M2 | DIASTOLIC BLOOD PRESSURE: 78 MMHG | WEIGHT: 195.2 LBS | HEIGHT: 66 IN | TEMPERATURE: 98.6 F | OXYGEN SATURATION: 97 % | SYSTOLIC BLOOD PRESSURE: 118 MMHG | HEART RATE: 88 BPM

## 2021-09-14 DIAGNOSIS — M54.41 ACUTE BILATERAL LOW BACK PAIN WITH BILATERAL SCIATICA: Primary | ICD-10-CM

## 2021-09-14 DIAGNOSIS — R20.2 PARESTHESIA: ICD-10-CM

## 2021-09-14 DIAGNOSIS — M54.6 ACUTE BILATERAL THORACIC BACK PAIN: ICD-10-CM

## 2021-09-14 DIAGNOSIS — M54.42 ACUTE BILATERAL LOW BACK PAIN WITH BILATERAL SCIATICA: Primary | ICD-10-CM

## 2021-09-14 PROCEDURE — 99213 OFFICE O/P EST LOW 20 MIN: CPT | Performed by: NURSE PRACTITIONER

## 2021-09-14 NOTE — PROGRESS NOTES
"Chief Complaint  Follow-up, Peripheral Neuropathy, and Numbness (tingling all over)    Subjective          Kira Godwin presents to [facility]  History of Present Illness  Presents today for an acute visit for concerns of a couple episodes of numbness and tingling.  Over the past week she had 1 or 2 episodes where she had thoracic back pain and low back pain then developed numbness tingling radiating over her body all extremities including face.  When she felt relaxed symptoms resolved.  The numbness tingling has improved over the past month.  She has a history of low back pain with worsening symptoms over the past 4 to 5 days.  Also has thoracic back pain.  Objective   Vital Signs:   /78   Pulse 88   Temp 98.6 °F (37 °C)   Ht 167.6 cm (66\")   Wt 88.5 kg (195 lb 3.2 oz)   SpO2 97%   BMI 31.51 kg/m²     Physical Exam  Vitals reviewed.   Constitutional:       Appearance: Normal appearance. She is well-developed.   HENT:      Head: Normocephalic and atraumatic.      Right Ear: External ear normal.      Left Ear: External ear normal.      Mouth/Throat:      Pharynx: No oropharyngeal exudate.   Eyes:      Conjunctiva/sclera: Conjunctivae normal.      Pupils: Pupils are equal, round, and reactive to light.   Cardiovascular:      Rate and Rhythm: Normal rate and regular rhythm.      Heart sounds: No murmur heard.   No friction rub. No gallop.    Pulmonary:      Effort: Pulmonary effort is normal.      Breath sounds: Normal breath sounds. No wheezing or rhonchi.   Abdominal:      General: Bowel sounds are normal. There is no distension.      Palpations: Abdomen is soft.      Tenderness: There is no abdominal tenderness.   Skin:     General: Skin is warm and dry.   Neurological:      Mental Status: She is alert and oriented to person, place, and time.      Cranial Nerves: No cranial nerve deficit.   Psychiatric:         Mood and Affect: Mood and affect normal.         " Behavior: Behavior normal.         Thought Content: Thought content normal.         Judgment: Judgment normal.        Result Review :                 Assessment and Plan    Diagnoses and all orders for this visit:    1. Acute bilateral low back pain with bilateral sciatica (Primary)  Comments:  We will consult physical therapy for thoracic and low back pain.  Pain may cause muscle spasms which could influence some of the paresthesia.  Orders:  -     Ambulatory Referral to Physical Therapy Evaluate and treat    2. Acute bilateral thoracic back pain  Comments:  Discussed may take ibuprofen or Tylenol for pain relief.  Discussed using ice and heat for low back and mid back pain.  Orders:  -     Ambulatory Referral to Physical Therapy Evaluate and treat    3. Paresthesia  Assessment & Plan:  She has an MRI scheduled of brain with and without contrast for paresthesia        Follow Up   No follow-ups on file.  Patient was given instructions and counseling regarding her condition or for health maintenance advice. Please see specific information pulled into the AVS if appropriate.

## 2021-09-23 ENCOUNTER — HOSPITAL ENCOUNTER (OUTPATIENT)
Dept: MRI IMAGING | Facility: HOSPITAL | Age: 36
Discharge: HOME | End: 2021-09-23
Admitting: NURSE PRACTITIONER

## 2021-09-23 DIAGNOSIS — R20.2 PARESTHESIA: ICD-10-CM

## 2021-09-23 PROCEDURE — 70551 MRI BRAIN STEM W/O DYE: CPT

## 2021-10-13 ENCOUNTER — TREATMENT (OUTPATIENT)
Dept: PHYSICAL THERAPY | Facility: CLINIC | Age: 36
End: 2021-10-13

## 2021-10-13 DIAGNOSIS — M54.2 CERVICAL PAIN: ICD-10-CM

## 2021-10-13 DIAGNOSIS — M54.50 BILATERAL LOW BACK PAIN, UNSPECIFIED CHRONICITY, UNSPECIFIED WHETHER SCIATICA PRESENT: Primary | ICD-10-CM

## 2021-10-13 PROCEDURE — 97162 PT EVAL MOD COMPLEX 30 MIN: CPT | Performed by: PHYSICAL THERAPIST

## 2021-10-13 NOTE — PROGRESS NOTES
"Physical Therapy Initial Evaluation and Plan of Care      Patient: Kira Godwin   : 1985  Diagnosis/ICD-10 Code:  Bilateral low back pain, unspecified chronicity, unspecified whether sciatica present [M54.50]  Referring practitioner: KEVIN Grimes  Date of Initial Visit: 10/13/2021  Today's Date: 10/13/2021  Patient seen for 1 sessions    Progress note due: 2021  Re-cert due: 2022           Subjective Questionnaire: UEFI: 57/80        Subjective Evaluation    History of Present Illness  Mechanism of injury: H&P from MD office: \"Presents today for an acute visit for concerns of a couple episodes of numbness and tingling.  Over the past week she had 1 or 2 episodes where she had thoracic back pain and low back pain then developed numbness tingling radiating over her body all extremities including face.  When she felt relaxed symptoms resolved.  The numbness tingling has improved over the past month.  She has a history of low back pain with worsening symptoms over the past 4 to 5 days.  Also has thoracic back pain.\"    Subjective comment: Pt states symptoms started in July and says she is convinced her symptoms started from taking a corticosteriod for poison ivy. Some time after she started getting tingling in her arms and legs. She went to her primary care provider and then was referred to a neurologist. She had MRI on her brain and neck but nothing was abnormal.  Overall her symptoms are not as intense and seems to be getting better. She also feels like her muscles feel weak occasionally. Pain  Current pain ratin  At worst pain rating: 10  Relieving factors: relaxation, rest and heat  Progression: improved    Hand dominance: right    Diagnostic Tests  X-ray: normal  MRI studies: normal    Patient Goals  Patient goals for therapy: increased strength and decreased pain             Objective          Static Posture     Head  Forward.    Shoulders  Rounded.    Thoracic " Spine  Hyperkyphosis.    Lumbar Spine   Increased lordosis.     Postural Observations  Seated posture: fair  Standing posture: fair        Neurological Testing     Sensation     Shoulder   Left Shoulder   Intact: light touch    Right Shoulder   Intact: light touch    Knee   Left Knee   Intact: light touch    Right Knee   Intact: light touch     Reflexes   Left   Biceps (C5/C6): normal (2+)  Triceps (C7): normal (2+)  Patellar (L4): normal (2+)    Right   Biceps (C5/C6): normal (2+)  Triceps (C7): normal (2+)  Patellar (L4): normal (2+)    Active Range of Motion   Cervical/Thoracic Spine   Normal active range of motion  Left Shoulder   Normal active range of motion    Right Shoulder   Normal active range of motion    Left Wrist   Normal active range of motion    Right Wrist   Normal active range of motion    Lumbar   Normal active range of motion  Left Knee   Normal active range of motion    Right Knee   Normal active range of motion  Left Ankle/Foot   Normal active range of motion    Right Ankle/Foot   Normal active range of motion    Strength/Myotome Testing     Left Shoulder     Planes of Motion   Flexion: 4   Abduction: 4+   External rotation at 0°: 4   Internal rotation at 0°: 4     Right Shoulder     Planes of Motion   Flexion: 4   Abduction: 4+   External rotation at 0°: 4   Internal rotation at 0°: 4     Left Wrist/Hand      (2nd hand position)     Trial 1: 60 lbs    Right Wrist/Hand      (2nd hand position)     Trial 1: 60 lbs    Left Hip   Planes of Motion   Flexion: 4  Abduction: 4  Adduction: 4+    Right Hip   Planes of Motion   Flexion: 4  Abduction: 4  Adduction: 4+    Left Knee   Flexion: 4+  Extension: 4+  Quadriceps contraction: good    Right Knee   Flexion: 4+  Extension: 4+  Quadriceps contraction: good    Left Ankle/Foot   Dorsiflexion: 5  Plantar flexion: 5    Right Ankle/Foot   Dorsiflexion: 5  Plantar flexion: 5              Assessment & Plan     Assessment  Impairments: activity  intolerance, impaired physical strength, lacks appropriate home exercise program and pain with function  Assessment details: Pt presents with neck and low back pain with N/T in BUE and BLE. Pt is limited in daily activities due to pain in BUE or BLE. Pt will benefit from skilled PT to address functional deficits and return to PLOF.       Prognosis: good  Functional Limitations: lifting, pulling, pushing, uncomfortable because of pain, sitting, standing, stooping and unable to perform repetitive tasks  Goals  Plan Goals: 1. The patient has complaints of pain.   LTG 1: 6 weeks:  The patient will report lower back pain no greater than 2 in order to improve tolerance with activities of daily living and improve sleep quality.    STATUS:  New   STG 1a: 3 weeks:  The patient will report lower back pain no greater than 4.     STATUS:  New   TREATMENT:  Manual therapy, therapeutic exercise, home exercise instruction, aquatic therapy, pelvic traction, and modalities as needed to include: electrical stimulation, ultrasound, moist heat, and ice.     2. Decreased B hip weakness    LTG 2: 6 weeks: Pt will improve bilateral hip flexor and abduction strength to 5/5 to improve BLE function.     STATUS:  New     STG 2a: 3 weeks:  Pt will improve bilateral hip flexor and abduction strength to 4+/5 to improve BLE function    STATUS:  New   TREATMENT: Manual therapy, therapeutic exercise, home exercise instruction, aquatic therapy, pelvic traction, and modalities as needed to include: electrical stimulation, ultrasound, moist heat, and ice.        3. Mobility: Walking/Moving Around Functional Limitation     LTG 3: 6 weeks:  The patient will demonstrate  UEFI score to 78 to decrease limitation.     STATUS:  New   STG 3a: 3 weeks:  The patient will demonstrate   UEFI  to 67 to decrease limitation.    STATUS:  New   TREATMENT:  Manual therapy, therapeutic exercise, home exercise instruction, and modalities as needed to include: moist heat,  electrical stimulation, and ultrasound.      Plan  Therapy options: will be seen for skilled physical therapy services  Planned modality interventions: cryotherapy, TENS and thermotherapy (hydrocollator packs)  Planned therapy interventions: abdominal trunk stabilization, manual therapy, neuromuscular re-education, body mechanics training, postural training, soft tissue mobilization, spinal/joint mobilization, strengthening, therapeutic activities, stretching, home exercise program and flexibility  Frequency: 2x week  Duration in weeks: 6  Treatment plan discussed with: patient        Visit Diagnoses:    ICD-10-CM ICD-9-CM   1. Bilateral low back pain, unspecified chronicity, unspecified whether sciatica present  M54.50 724.2   2. Cervical pain  M54.2 723.1       Timed:         Manual Therapy:    0     mins  91654;     Therapeutic Exercise:    0     mins  80777;     Neuromuscular Yesica:    0    mins  40221;    Therapeutic Activity:     0     mins  42756;     Gait Trainin     mins  31701;     Ultrasound:     0     mins  30784;    Ionto                               0    mins   31346  Self-care  __0__ mins 76168    Un-Timed:  Electrical Stimulation:    0     mins  13754 ( );  Traction     0     mins 70677  Low Eval     0     Mins  77469  Mod Eval     40     Mins  84495  High Eval                       0     Mins  45294  Hot pack     0     Mins    Cold pack                       0     Mins      Timed Treatment:   0   mins   Total Treatment:     40   mins    PT SIGNATURE: Rasta Lopez PT, DPT        Initial Certification  Certification Period: 10/13/2021 thru 2022  I certify that the therapy services are furnished while this patient is under my care.  The services outlined above are required by this patient, and will be reviewed every 90 days.     PHYSICIAN: Boaz Strong APRN      DATE:     Please sign and return via fax to [phone].. Thank you, [facility].

## 2021-10-15 ENCOUNTER — TREATMENT (OUTPATIENT)
Dept: PHYSICAL THERAPY | Facility: CLINIC | Age: 36
End: 2021-10-15

## 2021-10-15 DIAGNOSIS — M54.2 CERVICAL PAIN: ICD-10-CM

## 2021-10-15 DIAGNOSIS — M54.50 BILATERAL LOW BACK PAIN, UNSPECIFIED CHRONICITY, UNSPECIFIED WHETHER SCIATICA PRESENT: Primary | ICD-10-CM

## 2021-10-15 PROCEDURE — 97110 THERAPEUTIC EXERCISES: CPT | Performed by: PHYSICAL THERAPIST
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
## 2021-10-18 ENCOUNTER — TREATMENT (OUTPATIENT)
Dept: PHYSICAL THERAPY | Facility: CLINIC | Age: 36
End: 2021-10-18

## 2021-10-18 DIAGNOSIS — M54.2 CERVICAL PAIN: ICD-10-CM

## 2021-10-18 DIAGNOSIS — M54.50 BILATERAL LOW BACK PAIN, UNSPECIFIED CHRONICITY, UNSPECIFIED WHETHER SCIATICA PRESENT: Primary | ICD-10-CM

## 2021-10-18 PROCEDURE — 97140 MANUAL THERAPY 1/> REGIONS: CPT | Performed by: PHYSICAL THERAPIST

## 2021-10-18 PROCEDURE — 97110 THERAPEUTIC EXERCISES: CPT | Performed by: PHYSICAL THERAPIST

## 2021-10-18 NOTE — PROGRESS NOTES
Physical Therapy Daily Progress Note        Patient: Kira Godwin   : 1985  Diagnosis/ICD-10 Code:  Bilateral low back pain, unspecified chronicity, unspecified whether sciatica present [M54.50]  Referring practitioner: No ref. provider found  Date of Initial Visit: Type: THERAPY  Noted: 10/13/2021  Today's Date: 10/18/2021  Patient seen for 3 sessions             Subjective   Kira Godwin reports: low back being the biggest concern, states that it usually bothers her after sitting a long time in her computer chair.     Currently: 0/10 pain     Objective   No complaints of increased pain or discomfort.     See Exercise, Manual, and Modality Logs for complete treatment.       Assessment/Plan  Kira still experiencing increased low back  pain, especially when sitting for long periods of time. Pt tolerated exercises well, no complaints of increased pain or discomfort. Pt would benefit from skilled PT to address Range of Motion  and Strength deficits, pain management and any concerns with ADLs.     Progress per Plan of Care           Timed:  Manual Therapy:    15     mins  31925;  Therapeutic Exercise:    15     mins  70249;     Neuromuscular Yesica:        mins  46591;    Therapeutic Activity:          mins  75837;     Gait Training:           mins  66689;    Aquatic Therapy:          mins  85512;       Untimed:  Electrical Stimulation:         mins  19342 ( );  Mechanical Traction:         mins  41115;       Timed Treatment:   30   mins   Total Treatment:     30   mins        Bernie Angeles PTA  Physical Therapist Assistant

## 2021-10-25 ENCOUNTER — TREATMENT (OUTPATIENT)
Dept: PHYSICAL THERAPY | Facility: CLINIC | Age: 36
End: 2021-10-25

## 2021-10-25 DIAGNOSIS — M54.50 BILATERAL LOW BACK PAIN, UNSPECIFIED CHRONICITY, UNSPECIFIED WHETHER SCIATICA PRESENT: Primary | ICD-10-CM

## 2021-10-25 DIAGNOSIS — M54.2 CERVICAL PAIN: ICD-10-CM

## 2021-10-25 PROCEDURE — 97112 NEUROMUSCULAR REEDUCATION: CPT | Performed by: PHYSICAL THERAPIST

## 2021-10-25 PROCEDURE — 97110 THERAPEUTIC EXERCISES: CPT | Performed by: PHYSICAL THERAPIST

## 2021-10-25 NOTE — PROGRESS NOTES
Physical Therapy Daily Progress Note        Patient: Kira Godwin   : 1985  Diagnosis/ICD-10 Code:  Bilateral low back pain, unspecified chronicity, unspecified whether sciatica present [M54.50]  Referring practitioner: KEVIN Grimes  Date of Initial Visit: Type: THERAPY  Noted: 10/13/2021  Today's Date: 10/25/2021  Patient seen for 4 sessions             Subjective   Kira Godwin reports: pain depends on the day and if she's sat a lot in her office chair or if she has been standing a lot.     Objective   No complaints of increased pain or discomfort.     See Exercise, Manual, and Modality Logs for complete treatment.       Assessment/Plan  Kira still experiencing increased low back and neck pain, especially when sitting or standing to long. Pt tolerated exercises well, no complaints of increased pain or discomfort. Pt would benefit from skilled PT to address Range of Motion  and Strength deficits, pain management and any concerns with ADLs.     Progress per Plan of Care           Timed:  Manual Therapy:         mins  53020;  Therapeutic Exercise:    20     mins  20197;     Neuromuscular Yesica:    10    mins  88320;    Therapeutic Activity:          mins  35633;     Gait Training:           mins  49478;    Aquatic Therapy:          mins  18594;       Untimed:  Electrical Stimulation:         mins  12790 ( );  Mechanical Traction:         mins  01946;       Timed Treatment:   30   mins   Total Treatment:     30   mins      Electronically signed:   Bernie Angeles PTA  Physical Therapist Assistant  [facility] License #: [id number]   N/A

## 2021-11-01 ENCOUNTER — TREATMENT (OUTPATIENT)
Dept: PHYSICAL THERAPY | Facility: CLINIC | Age: 36
End: 2021-11-01

## 2021-11-01 DIAGNOSIS — M54.2 CERVICAL PAIN: ICD-10-CM

## 2021-11-01 DIAGNOSIS — M54.50 BILATERAL LOW BACK PAIN, UNSPECIFIED CHRONICITY, UNSPECIFIED WHETHER SCIATICA PRESENT: Primary | ICD-10-CM

## 2021-11-01 PROCEDURE — 97110 THERAPEUTIC EXERCISES: CPT | Performed by: PHYSICAL THERAPIST

## 2021-11-01 PROCEDURE — 97140 MANUAL THERAPY 1/> REGIONS: CPT | Performed by: PHYSICAL THERAPIST

## 2021-11-01 NOTE — PROGRESS NOTES
Physical Therapy Daily Progress Note        Patient: Kira Godwin   : 1985  Diagnosis/ICD-10 Code:  Bilateral low back pain, unspecified chronicity, unspecified whether sciatica present [M54.50]  Referring practitioner: KEVIN Grimes  Date of Initial Visit: Type: THERAPY  Noted: 10/13/2021  Today's Date: 2021  Patient seen for 5 sessions             Subjective   Kira Godwin reports: low back still bothering her but seems to be better even after driving to [place] over the weekend. States that she has a lot of scalp sensitivities and pain from neck.     Objective   Increased sensitivity with prolonged suboccipital release manual and self mobilization.      See Exercise, Manual, and Modality Logs for complete treatment.       Assessment/Plan  Kira still experiencing increased  neck pain, even scalp sensitivities. Pt had some increased discomfort with prolonged suboccipital release. Pt would benefit from skilled PT to address Range of Motion  and Strength deficits, pain management and any concerns with ADLs.     Progress per Plan of Care           Timed:  Manual Therapy:    20     mins  95187;  Therapeutic Exercise:    10     mins  36595;     Neuromuscular Yesica:        mins  87412;    Therapeutic Activity:          mins  50264;     Gait Training:           mins  20782;    Aquatic Therapy:          mins  66985;       Untimed:  Electrical Stimulation:         mins  63010 ( );  Mechanical Traction:         mins  69767;       Timed Treatment:   30   mins   Total Treatment:     30   mins      Electronically signed:   Bernie Angeles PTA  Physical Therapist Assistant  [facility] License #: [id number]

## 2021-11-08 ENCOUNTER — TREATMENT (OUTPATIENT)
Dept: PHYSICAL THERAPY | Facility: CLINIC | Age: 36
End: 2021-11-08

## 2021-11-08 DIAGNOSIS — M54.50 BILATERAL LOW BACK PAIN, UNSPECIFIED CHRONICITY, UNSPECIFIED WHETHER SCIATICA PRESENT: Primary | ICD-10-CM

## 2021-11-08 DIAGNOSIS — M54.2 CERVICAL PAIN: ICD-10-CM

## 2021-11-08 PROCEDURE — 97110 THERAPEUTIC EXERCISES: CPT | Performed by: PHYSICAL THERAPIST

## 2021-11-08 PROCEDURE — 97140 MANUAL THERAPY 1/> REGIONS: CPT | Performed by: PHYSICAL THERAPIST

## 2021-11-08 NOTE — PROGRESS NOTES
Physical Therapy Daily Progress Note        Patient: Kira Godwin   : 1985  Diagnosis/ICD-10 Code:  Bilateral low back pain, unspecified chronicity, unspecified whether sciatica present [M54.50]  Referring practitioner: KEVIN Grimes  Date of Initial Visit: Type: THERAPY  Noted: 10/13/2021  Today's Date: 2021  Patient seen for 6 sessions             Subjective   Kira Godwin reports: having a lot of pins and needles in the neck, but feels like the pain is about the same throughout.     Objective   No complaints of increased pain or discomfort.     See Exercise, Manual, and Modality Logs for complete treatment.       Assessment/Plan  Kira still experiencing increased neck and low back  pain, even pins and needles in the neck. Pt tolerated exercises well, no complaints of increased pain or discomfort. Pt would benefit from skilled PT to address Range of Motion  and Strength deficits, pain management and any concerns with ADLs.     Progress per Plan of Care           Timed:  Manual Therapy:    10     mins  29034;  Therapeutic Exercise:    20     mins  72683;     Neuromuscular Yesica:        mins  76082;    Therapeutic Activity:          mins  33514;     Gait Training:           mins  36858;    Aquatic Therapy:          mins  39772;       Untimed:  Electrical Stimulation:         mins  96190 ( );  Mechanical Traction:         mins  72943;       Timed Treatment:   30   mins   Total Treatment:     30   mins      Electronically signed:   Bernie Angeles PTA  Physical Therapist Assistant  [facility] License #: [id number]

## 2021-11-16 ENCOUNTER — TREATMENT (OUTPATIENT)
Dept: PHYSICAL THERAPY | Facility: CLINIC | Age: 36
End: 2021-11-16

## 2021-11-16 DIAGNOSIS — M54.2 CERVICAL PAIN: ICD-10-CM

## 2021-11-16 DIAGNOSIS — M54.50 BILATERAL LOW BACK PAIN, UNSPECIFIED CHRONICITY, UNSPECIFIED WHETHER SCIATICA PRESENT: Primary | ICD-10-CM

## 2021-11-16 PROCEDURE — 97110 THERAPEUTIC EXERCISES: CPT | Performed by: PHYSICAL THERAPIST

## 2021-11-16 NOTE — PROGRESS NOTES
Discharge note     Patient: Kira Godwin   : 1985  Diagnosis/ICD-10 Code:  Bilateral low back pain, unspecified chronicity, unspecified whether sciatica present [M54.50]  Referring practitioner: KEVIN Grimes  Date of Initial Visit: Type: THERAPY  Noted: 10/13/2021  Today's Date: 2021  Patient seen for 7 sessions    Progress note due: 2021  Re-cert due: 2022    Subjective:     Subjective Questionnaire: UEFI: 80/80  Clinical Progress: improved  Home Program Compliance: Yes  Treatment has included: therapeutic exercise and manual therapy    Subjective Evaluation    Pain  Current pain ratin         Objective   Static Posture      Head  Forward.     Shoulders  Rounded.     Thoracic Spine  Hyperkyphosis.     Lumbar Spine   Increased lordosis.      Postural Observations  Seated posture: good             Neurological Testing      Sensation      Shoulder   Left Shoulder   Intact: light touch     Right Shoulder   Intact: light touch     Knee   Left Knee   Intact: light touch     Right Knee   Intact: light touch      Reflexes   Left   Biceps (C5/C6): normal (2+)  Triceps (C7): normal (2+)  Patellar (L4): normal (2+)     Right   Biceps (C5/C6): normal (2+)  Triceps (C7): normal (2+)  Patellar (L4): normal (2+)     Active Range of Motion   Cervical/Thoracic Spine   Normal active range of motion  Left Shoulder   Normal active range of motion    Right Shoulder   Normal active range of motion     Left Wrist   Normal active range of motion     Right Wrist   Normal active range of motion     Lumbar   Normal active range of motion  Left Knee   Normal active range of motion     Right Knee   Normal active range of motion  Left Ankle/Foot   Normal active range of motion     Right Ankle/Foot   Normal active range of motion     Strength/Myotome Testing     Left Shoulder      Planes of Motion   Flexion: 4   Abduction: 4+   External rotation at 0°: 4   Internal rotation at 0°: 4     Right Shoulder       Planes of Motion   Flexion: 4+  Abduction: 4+   External rotation at 0°: 4 +  Internal rotation at 0°: 4 +     Left Wrist/Hand       (2nd hand position)     Trial 1: 60 lbs     Right Wrist/Hand       (2nd hand position)     Trial 1: 70 lbs     Left Hip   Planes of Motion   Flexion: 4+  Abduction: 4+  Adduction: 4+     Right Hip   Planes of Motion   Flexion: 4+  Abduction: 4  Adduction: 4+     Left Knee   Flexion: 4+  Extension: 4+  Quadriceps contraction: good     Right Knee   Flexion: 4+  Extension: 4+  Quadriceps contraction: good     Left Ankle/Foot   Dorsiflexion: 5  Plantar flexion: 5     Right Ankle/Foot   Dorsiflexion: 5  Plantar flexion: 5        Assessment & Plan     Assessment  Assessment details: Pt demonstrates significant  Improvement in neck and low back pain  And symptoms since  beginning of PT. Pt educated to continue with HEP at home. Pt to follow up with referring provider if symptoms get wores and will be discharged from PT today.     Goals  Plan Goals: Plan Goals: 1. The patient has complaints of pain.              LTG 1: 6 weeks:  The patient will report lower back pain no greater than 2 in order to improve tolerance with activities of daily living and improve sleep quality.                          STATUS:  met              STG 1a: 3 weeks:  The patient will report lower back pain no greater than 4.                           STATUS:  met              TREATMENT:  Manual therapy, therapeutic exercise, home exercise instruction, aquatic therapy, pelvic traction, and modalities as needed to include: electrical stimulation, ultrasound, moist heat, and ice.                2. Decreased B hip weakness               LTG 2: 6 weeks: Pt will improve bilateral hip flexor and abduction strength to 5/5 to improve BLE function.                           STATUS:  Not met               STG 2a: 3 weeks:  Pt will improve bilateral hip flexor and abduction strength to 4+/5 to improve BLE function                           STATUS:  met              TREATMENT: Manual therapy, therapeutic exercise, home exercise instruction, aquatic therapy, pelvic traction, and modalities as needed to include: electrical stimulation, ultrasound, moist heat, and ice.                              3. Mobility: Walking/Moving Around Functional Limitation                               LTG 3: 6 weeks:  The patient will demonstrate  UEFI score to 78 to decrease limitation.                           STATUS:  met              STG 3a: 3 weeks:  The patient will demonstrate   UEFI  to 67 to decrease limitation.                          STATUS:  met              TREATMENT:  Manual therapy, therapeutic exercise, home exercise instruction, and modalities as needed to include: moist heat, electrical stimulation, and ultrasound.    Plan  Therapy options: will not be seen for skilled physical therapy services        Visit Diagnoses:    ICD-10-CM ICD-9-CM   1. Bilateral low back pain, unspecified chronicity, unspecified whether sciatica present  M54.50 724.2   2. Cervical pain  M54.2 723.1       Progress toward previous goals: Partially Met    Recommendations: Discharge     PT Signature: Rasta Lopez PT, DPT    NPI:[id number]  [place] License: [id number]      Based upon review of the patient's progress and continued therapy plan, it is my medical opinion that Kira Godwin should continue physical therapy treatment at [facility]  [address] [place] [id number] [phone].  Signature:  Boaz Strong APRN    Timed:         Manual Therapy:    0     mins  91880;     Therapeutic Exercise:    25     mins  12155;     Neuromuscular Yesica:    0    mins  75111;    Therapeutic Activity:     0     mins  06036;     Gait Trainin     mins  34205;     Ultrasound:     0     mins  [address]109;    Ionto                               0    mins   96920  Self-care  __0__ mins 63172    Un-Timed:  Electrical  Stimulation:    0     mins  88175 ( );  Traction     0     mins 85520  Re- Eval    5     Mins     Hot pack     0     Mins    Cold pack                       0     Mins      Timed Treatment:   25   mins   Total Treatment:     25   mins

## 2022-02-16 ENCOUNTER — TELEPHONE (OUTPATIENT)
Dept: FAMILY MEDICINE CLINIC | Facility: CLINIC | Age: 37
End: 2022-02-16

## 2022-02-16 NOTE — TELEPHONE ENCOUNTER
Caller: Kira Godwin    Relationship: Self    Best call back number: [phone]     What is the medical concern/diagnosis: PATIENT STATED IT HAD TO DO WITH A NERVE THAT SHE HAS SEEN PCP ABOUT PREVIOUSLY.     What specialty or service is being requested: NEUROLOGIST    Any additional details: PATIENT IS SEEKING PROVIDERS PREFERENCE ON WHERE TO BE REFERRED.  PATIENT IS SEEKING WITHIN [facility].

## 2022-02-18 NOTE — TELEPHONE ENCOUNTER
Caller: Tato Kira    Relationship: Self    Best call back number: [phone]    What is the medical concern/diagnosis: ISSUE WITH NERVE     What specialty or service is being requested: Neurology    What is the provider, practice or medical service name: Moy Richter M.D.    What is the office location: [address] [place] [address]    What is the office phone number: [phone]    Any additional details:

## 2022-02-18 NOTE — TELEPHONE ENCOUNTER
HUB TO READ:    Patient needs an appointment to discuss nerve issue with pcp. She has not been seen since September 2021.

## 2022-03-23 ENCOUNTER — TELEPHONE (OUTPATIENT)
Dept: FAMILY MEDICINE CLINIC | Facility: CLINIC | Age: 37
End: 2022-03-23

## 2022-03-23 ENCOUNTER — OFFICE VISIT (OUTPATIENT)
Dept: FAMILY MEDICINE CLINIC | Facility: CLINIC | Age: 37
End: 2022-03-23

## 2022-03-23 VITALS
HEART RATE: 90 BPM | TEMPERATURE: 97.6 F | WEIGHT: 196.6 LBS | HEIGHT: 66 IN | OXYGEN SATURATION: 99 % | BODY MASS INDEX: 31.6 KG/M2 | DIASTOLIC BLOOD PRESSURE: 70 MMHG | SYSTOLIC BLOOD PRESSURE: 118 MMHG

## 2022-03-23 DIAGNOSIS — R20.2 PARESTHESIA: Primary | ICD-10-CM

## 2022-03-23 PROCEDURE — 99212 OFFICE O/P EST SF 10 MIN: CPT | Performed by: NURSE PRACTITIONER

## 2022-03-23 NOTE — TELEPHONE ENCOUNTER
Caller: Kira Godwin    Relationship: Self    Best call back number: [phone]    What is the provider, practice or medical service name:   [facility]     What is the office location: [place]       Any additional details:PATIENT IS CALLING WANTING TO MAKE SURE TO GIVE YOU NAME OF THE PCP SHE WOULD LIKE TO GO SEE REGARDING HER REFERRAL.

## 2022-03-23 NOTE — PROGRESS NOTES
"Chief Complaint  neurology referral     Subjective          Kira Godwin presents to [facility]  History of Present Illness  Presents today in request for neurology consultation.  She reports since having methylprednisolone on July 2, 2021 she developed paresthesia.  Paresthesia was generalized throughout her body.  She has been to urgent care or emergency department and here in office multiple times for the paresthesia.  She also reports having neck pain.  The paresthesia has improved.  Only reports minimal at times in her face.  Neck pain is also improving.  She reports she feels muscle weakness in the morning but then improves throughout the day.  She had an MRI of the brain September 2, 2021 that was normal.  Cervical spine x-rays were normal.  There was minimal anterior spurring seen in the C5-C6.  Vitamin B12 was normal at 897.    Patient states she is concerned of the following symptoms for possible demyelination and is requesting to see a neurologist for further evaluation and treatment.    Looking at her care gaps she has not had a Pap smear in a couple years.  At this time she declines scheduling a Pap smear.  We will discuss at next office visit.    Objective   Vital Signs:   /70   Pulse 90   Temp 97.6 °F (36.4 °C)   Ht 167.6 cm (66\")   Wt 89.2 kg (196 lb 9.6 oz)   SpO2 99%   BMI 31.73 kg/m²            Physical Exam  Vitals reviewed.   Constitutional:       Appearance: Normal appearance. She is well-developed.   HENT:      Head: Normocephalic and atraumatic.      Right Ear: External ear normal.      Left Ear: External ear normal.      Mouth/Throat:      Pharynx: No oropharyngeal exudate.   Eyes:      Conjunctiva/sclera: Conjunctivae normal.      Pupils: Pupils are equal, round, and reactive to light.   Cardiovascular:      Rate and Rhythm: Normal rate and regular rhythm.      Heart sounds: No murmur heard.    No friction rub. No gallop.   Pulmonary:      " Effort: Pulmonary effort is normal.      Breath sounds: Normal breath sounds. No wheezing or rhonchi.   Abdominal:      General: Bowel sounds are normal. There is no distension.      Palpations: Abdomen is soft.      Tenderness: There is no abdominal tenderness.   Skin:     General: Skin is warm and dry.   Neurological:      Mental Status: She is alert and oriented to person, place, and time.      Cranial Nerves: No cranial nerve deficit.   Psychiatric:         Mood and Affect: Affect normal. Mood is anxious.         Behavior: Behavior normal.         Thought Content: Thought content normal.         Judgment: Judgment normal.        Result Review :     Common labs    Common Labsle 7/23/21 7/23/21 8/3/21 8/3/21    2010 2010 1457 1457   Glucose  102 (A)     BUN  8     Creatinine  0.62     eGFR Non African Am  108     Sodium  136     Potassium  3.8     Chloride  105     Calcium  9.1     Albumin  3.90     Total Bilirubin  0.3     Alkaline Phosphatase  72     AST (SGOT)  17     ALT (SGPT)  16     WBC 9.86      Hemoglobin 13.9      Hematocrit 40.0      Platelets 336      Total Cholesterol   185    Triglycerides   170 (A)    HDL Cholesterol   44    LDL Cholesterol    111 (A)    Hemoglobin A1C    5.30   (A) Abnormal value                      Assessment and Plan    Diagnoses and all orders for this visit:    1. Paresthesia (Primary)  Assessment & Plan:  Paresthesias improving.  Consult neurology for further evaluation per request of patient.        Follow Up   Return if symptoms worsen or fail to improve, for Next scheduled follow up.  Patient was given instructions and counseling regarding her condition or for health maintenance advice. Please see specific information pulled into the AVS if appropriate.

## 2022-08-15 ENCOUNTER — HOSPITAL ENCOUNTER (EMERGENCY)
Facility: HOSPITAL | Age: 37
Discharge: LEFT BEFORE DISCHARGE | End: 2022-08-15

## 2022-08-15 PROCEDURE — 99211 OFF/OP EST MAY X REQ PHY/QHP: CPT

## 2024-03-15 ENCOUNTER — OFFICE VISIT (OUTPATIENT)
Dept: FAMILY MEDICINE CLINIC | Facility: CLINIC | Age: 39
End: 2024-03-15
Payer: COMMERCIAL

## 2024-03-15 VITALS
TEMPERATURE: 98.1 F | OXYGEN SATURATION: 96 % | HEART RATE: 90 BPM | RESPIRATION RATE: 17 BRPM | WEIGHT: 202.2 LBS | BODY MASS INDEX: 32.5 KG/M2 | DIASTOLIC BLOOD PRESSURE: 68 MMHG | SYSTOLIC BLOOD PRESSURE: 110 MMHG | HEIGHT: 66 IN

## 2024-03-15 DIAGNOSIS — R79.89 ABNORMAL CBC: ICD-10-CM

## 2024-03-15 DIAGNOSIS — Z12.4 CERVICAL CANCER SCREENING: ICD-10-CM

## 2024-03-15 DIAGNOSIS — N76.1 CHRONIC VAGINITIS: ICD-10-CM

## 2024-03-15 DIAGNOSIS — R23.2 HOT FLASHES: ICD-10-CM

## 2024-03-15 DIAGNOSIS — R20.2 PARESTHESIA: Primary | ICD-10-CM

## 2024-03-15 DIAGNOSIS — Z12.31 BREAST CANCER SCREENING BY MAMMOGRAM: ICD-10-CM

## 2024-03-15 LAB
ALBUMIN SERPL-MCNC: 3.9 G/DL (ref 3.5–5.2)
ALBUMIN/GLOB SERPL: 1.2 G/DL
ALP SERPL-CCNC: 65 U/L (ref 39–117)
ALT SERPL W P-5'-P-CCNC: 15 U/L (ref 1–33)
ANION GAP SERPL CALCULATED.3IONS-SCNC: 8.9 MMOL/L (ref 5–15)
AST SERPL-CCNC: 18 U/L (ref 1–32)
BASOPHILS # BLD AUTO: 0.02 10*3/MM3 (ref 0–0.2)
BASOPHILS NFR BLD AUTO: 0.3 % (ref 0–1.5)
BILIRUB SERPL-MCNC: 0.4 MG/DL (ref 0–1.2)
BUN SERPL-MCNC: 8 MG/DL (ref 6–20)
BUN/CREAT SERPL: 13.1 (ref 7–25)
CALCIUM SPEC-SCNC: 8.9 MG/DL (ref 8.6–10.5)
CANDIDA SPECIES: NEGATIVE
CHLORIDE SERPL-SCNC: 107 MMOL/L (ref 98–107)
CO2 SERPL-SCNC: 23.1 MMOL/L (ref 22–29)
CREAT SERPL-MCNC: 0.61 MG/DL (ref 0.57–1)
DEPRECATED RDW RBC AUTO: 40 FL (ref 37–54)
EGFRCR SERPLBLD CKD-EPI 2021: 116.1 ML/MIN/1.73
EOSINOPHIL # BLD AUTO: 0.23 10*3/MM3 (ref 0–0.4)
EOSINOPHIL NFR BLD AUTO: 3.2 % (ref 0.3–6.2)
ERYTHROCYTE [DISTWIDTH] IN BLOOD BY AUTOMATED COUNT: 12.1 % (ref 12.3–15.4)
FOLATE SERPL-MCNC: 11 NG/ML (ref 4.78–24.2)
GARDNERELLA VAGINALIS: NEGATIVE
GLOBULIN UR ELPH-MCNC: 3.2 GM/DL
GLUCOSE SERPL-MCNC: 78 MG/DL (ref 65–99)
HCT VFR BLD AUTO: 39.8 % (ref 34–46.6)
HGB BLD-MCNC: 13.3 G/DL (ref 12–15.9)
IMM GRANULOCYTES # BLD AUTO: 0.02 10*3/MM3 (ref 0–0.05)
IMM GRANULOCYTES NFR BLD AUTO: 0.3 % (ref 0–0.5)
IRON 24H UR-MRATE: 87 MCG/DL (ref 37–145)
IRON SATN MFR SERPL: 22 % (ref 20–50)
LYMPHOCYTES # BLD AUTO: 1.31 10*3/MM3 (ref 0.7–3.1)
LYMPHOCYTES NFR BLD AUTO: 18.2 % (ref 19.6–45.3)
MAGNESIUM SERPL-MCNC: 1.9 MG/DL (ref 1.6–2.6)
MCH RBC QN AUTO: 30.6 PG (ref 26.6–33)
MCHC RBC AUTO-ENTMCNC: 33.4 G/DL (ref 31.5–35.7)
MCV RBC AUTO: 91.5 FL (ref 79–97)
MONOCYTES # BLD AUTO: 0.57 10*3/MM3 (ref 0.1–0.9)
MONOCYTES NFR BLD AUTO: 7.9 % (ref 5–12)
NEUTROPHILS NFR BLD AUTO: 5.04 10*3/MM3 (ref 1.7–7)
NEUTROPHILS NFR BLD AUTO: 70.1 % (ref 42.7–76)
NRBC BLD AUTO-RTO: 0 /100 WBC (ref 0–0.2)
PLATELET # BLD AUTO: 386 10*3/MM3 (ref 140–450)
PMV BLD AUTO: 10 FL (ref 6–12)
POTASSIUM SERPL-SCNC: 3.9 MMOL/L (ref 3.5–5.2)
PROLACTIN SERPL-MCNC: 14.2 NG/ML (ref 4.79–23.3)
PROT SERPL-MCNC: 7.1 G/DL (ref 6–8.5)
RBC # BLD AUTO: 4.35 10*6/MM3 (ref 3.77–5.28)
SODIUM SERPL-SCNC: 139 MMOL/L (ref 136–145)
T VAGINALIS DNA VAG QL PROBE+SIG AMP: NEGATIVE
T-UPTAKE NFR SERPL: 0.92 TBI (ref 0.8–1.3)
T4 SERPL-MCNC: 6.98 MCG/DL (ref 4.5–11.7)
TIBC SERPL-MCNC: 402 MCG/DL (ref 298–536)
TRANSFERRIN SERPL-MCNC: 270 MG/DL (ref 200–360)
TSH SERPL DL<=0.05 MIU/L-ACNC: 1.28 UIU/ML (ref 0.27–4.2)
VIT B12 BLD-MCNC: 1063 PG/ML (ref 211–946)
WBC NRBC COR # BLD AUTO: 7.19 10*3/MM3 (ref 3.4–10.8)

## 2024-03-15 PROCEDURE — 86038 ANTINUCLEAR ANTIBODIES: CPT | Performed by: FAMILY MEDICINE

## 2024-03-15 PROCEDURE — 84466 ASSAY OF TRANSFERRIN: CPT | Performed by: FAMILY MEDICINE

## 2024-03-15 PROCEDURE — 80050 GENERAL HEALTH PANEL: CPT | Performed by: FAMILY MEDICINE

## 2024-03-15 PROCEDURE — 87624 HPV HI-RISK TYP POOLED RSLT: CPT | Performed by: FAMILY MEDICINE

## 2024-03-15 PROCEDURE — 82607 VITAMIN B-12: CPT | Performed by: FAMILY MEDICINE

## 2024-03-15 PROCEDURE — 87660 TRICHOMONAS VAGIN DIR PROBE: CPT | Performed by: FAMILY MEDICINE

## 2024-03-15 PROCEDURE — 87480 CANDIDA DNA DIR PROBE: CPT | Performed by: FAMILY MEDICINE

## 2024-03-15 PROCEDURE — 83735 ASSAY OF MAGNESIUM: CPT | Performed by: FAMILY MEDICINE

## 2024-03-15 PROCEDURE — 84146 ASSAY OF PROLACTIN: CPT | Performed by: FAMILY MEDICINE

## 2024-03-15 PROCEDURE — 82746 ASSAY OF FOLIC ACID SERUM: CPT | Performed by: FAMILY MEDICINE

## 2024-03-15 PROCEDURE — 87510 GARDNER VAG DNA DIR PROBE: CPT | Performed by: FAMILY MEDICINE

## 2024-03-15 PROCEDURE — 83540 ASSAY OF IRON: CPT | Performed by: FAMILY MEDICINE

## 2024-03-15 PROCEDURE — 84436 ASSAY OF TOTAL THYROXINE: CPT | Performed by: FAMILY MEDICINE

## 2024-03-15 PROCEDURE — G0123 SCREEN CERV/VAG THIN LAYER: HCPCS | Performed by: FAMILY MEDICINE

## 2024-03-15 PROCEDURE — 84479 ASSAY OF THYROID (T3 OR T4): CPT | Performed by: FAMILY MEDICINE

## 2024-03-15 RX ORDER — CHOLECALCIFEROL (VITAMIN D3) 125 MCG
CAPSULE ORAL
COMMUNITY
Start: 2023-11-01

## 2024-03-15 RX ORDER — MULTIVITAMIN WITH IRON
1 TABLET ORAL DAILY
COMMUNITY

## 2024-03-15 NOTE — PROGRESS NOTES
Chief Complaint  New Patient Visit  Neurological symptoms    HPI:  Kira Godwin presents to [facility]    Pt is originally from [facility] and moved here in 2018.  Pt was taking methyl-prednisone in 2021 and she feels she had some paresthesias and had pins and needles head to foot muscle pain and headaches and eye pain and fatigue.  Mental fatigue and brain fog. Pt felt like she was having a hot flash and was having dysphagia.     Pt reports her periods have just started to get abnormal. She reports that they are heavier than before. She reports having hot flashes. She also reports being overdue for her pap smear.     Procedures     Past History:  Medical History: has a past medical history of Sleep apnea.   Surgical History: has no past surgical history on file.   Family History: family history includes Hypertension in her mother; Kidney disease in her mother; Stroke in her mother.   Social History: reports that she has never smoked. She has never been exposed to tobacco smoke. She has never used smokeless tobacco. She reports that she does not drink alcohol and does not use drugs.  Immunization History   Administered Date(s) Administered    COVID-19 (PFIZER) BIVALENT 12+YRS 10/17/2022    COVID-19 (PFIZER) Purple Cap Monovalent 04/13/2021, 05/04/2021, 11/19/2021    COVID-19 F23 (PFIZER) 12YRS+ (COMIRNATY) 10/23/2023         Allergies: Methylprednisolone     Medications:  Current Outpatient Medications on File Prior to Visit   Medication Sig Dispense Refill    B Complex-C (B-complex with vitamin C) tablet Take 1 tablet by mouth Daily.      cholecalciferol (VITAMIN D3) 25 MCG (1000 UT) tablet Take 1 tablet by mouth Daily.      L-Theanine 200 MG capsule       Magnesium 200 MG chewable tablet       Omega-3 Fatty Acids (OMEGA 3 500 PO) Take 1 capsule by mouth Daily.      vitamin B-12 (CYANOCOBALAMIN) 1000 MCG tablet Take 1 tablet by mouth Daily.       No current  "facility-administered medications on file prior to visit.        Health Maintenance Due   Topic Date Due    TDAP/TD VACCINES (1 - Tdap) Never done    ANNUAL PHYSICAL  Never done    BMI FOLLOWUP  09/14/2022    INFLUENZA VACCINE  Never done       Vital Signs:   Vitals:    03/15/24 0836   BP: 110/68   BP Location: Left arm   Patient Position: Sitting   Cuff Size: Adult   Pulse: 90   Resp: 17   Temp: 98.1 °F (36.7 °C)   TempSrc: Oral   SpO2: 96%   Weight: 91.7 kg (202 lb 3.2 oz)   Height: 167.6 cm (66\")      Body mass index is 32.64 kg/m².     ROS:  Review of Systems   Constitutional:  Negative for fatigue and fever.   HENT:  Negative for congestion, ear pain and sinus pressure.    Respiratory:  Negative for cough, chest tightness and shortness of breath.    Cardiovascular:  Negative for chest pain, palpitations and leg swelling.   Gastrointestinal:  Negative for abdominal pain and diarrhea.   Genitourinary:  Negative for dysuria and frequency.   Neurological:  Negative for speech difficulty, headache and confusion.   Psychiatric/Behavioral:  Negative for agitation and behavioral problems.           Physical Exam  Vitals reviewed. Exam conducted with a chaperone present.   Constitutional:       Appearance: Normal appearance.   HENT:      Right Ear: Tympanic membrane normal.      Left Ear: Tympanic membrane normal.      Nose: Nose normal.   Eyes:      Extraocular Movements: Extraocular movements intact.      Conjunctiva/sclera: Conjunctivae normal.      Pupils: Pupils are equal, round, and reactive to light.   Cardiovascular:      Rate and Rhythm: Normal rate and regular rhythm.   Pulmonary:      Effort: Pulmonary effort is normal.      Breath sounds: Normal breath sounds.   Abdominal:      General: Bowel sounds are normal.   Genitourinary:     General: Normal vulva.      Vagina: No vaginal discharge.      Cervix: Normal.      Uterus: Normal.       Adnexa: Right adnexa normal and left adnexa normal.   Musculoskeletal:    "      General: Normal range of motion.      Cervical back: Normal range of motion.   Skin:     General: Skin is warm and dry.   Neurological:      General: No focal deficit present.      Mental Status: She is alert and oriented to person, place, and time.   Psychiatric:         Mood and Affect: Mood normal.         Behavior: Behavior normal.          Result Review        Diagnoses and all orders for this visit:    1. Paresthesia (Primary)  -     JINA With / DsDNA, RNP, Sjogrens A / B, Andrews  -     Vitamin B12 & Folate  -     Comprehensive Metabolic Panel  -     Magnesium    2. Abnormal CBC  -     CBC Auto Differential  -     Iron Profile    3. Hot flashes  -     Prolactin  -     Thyroid Antibodies  -     Thyroid Panel With TSH    4. Breast cancer screening by mammogram  -     Mammo Screening Digital Tomosynthesis Bilateral With CAD; Future    5. Chronic vaginitis  -     Gardnerella vaginalis, Trichomonas vaginalis, Candida albicans, DNA - Swab, Cervix    6. Cervical cancer screening  -     IgP, Aptima HPV  -     PDF Report        BMI is >= 30 and <35. (Class 1 Obesity). The following options were offered after discussion;: nutrition counseling/recommendations       Smoking Cessation:    Kira Godwin  reports that she has never smoked. She has never been exposed to tobacco smoke. She has never used smokeless tobacco.        Follow Up   Return in about 4 weeks (around 4/12/2024).  Patient was given instructions and counseling regarding her condition or for health maintenance advice. Please see specific information pulled into the AVS if appropriate.       Brianda Meeks MD

## 2024-03-18 LAB — ANA SER QL: NEGATIVE

## 2024-03-20 LAB
CYTOLOGIST CVX/VAG CYTO: NORMAL
CYTOLOGY CVX/VAG DOC CYTO: NORMAL
CYTOLOGY CVX/VAG DOC THIN PREP: NORMAL
DX ICD CODE: NORMAL
HPV I/H RISK 4 DNA CVX QL PROBE+SIG AMP: NEGATIVE
Lab: NORMAL
Lab: NORMAL
OTHER STN SPEC: NORMAL
STAT OF ADQ CVX/VAG CYTO-IMP: NORMAL

## 2024-04-15 ENCOUNTER — OFFICE VISIT (OUTPATIENT)
Dept: FAMILY MEDICINE CLINIC | Facility: CLINIC | Age: 39
End: 2024-04-15
Payer: COMMERCIAL

## 2024-04-15 VITALS
BODY MASS INDEX: 32.42 KG/M2 | TEMPERATURE: 98.1 F | OXYGEN SATURATION: 98 % | DIASTOLIC BLOOD PRESSURE: 66 MMHG | WEIGHT: 201.7 LBS | SYSTOLIC BLOOD PRESSURE: 110 MMHG | HEART RATE: 89 BPM | HEIGHT: 66 IN

## 2024-04-15 DIAGNOSIS — R13.14 PHARYNGOESOPHAGEAL DYSPHAGIA: ICD-10-CM

## 2024-04-15 DIAGNOSIS — N76.1 CHRONIC VAGINITIS: ICD-10-CM

## 2024-04-15 DIAGNOSIS — R20.2 PARESTHESIA: Primary | ICD-10-CM

## 2024-04-15 DIAGNOSIS — E01.0 THYROMEGALY: ICD-10-CM

## 2024-04-15 PROCEDURE — 99213 OFFICE O/P EST LOW 20 MIN: CPT | Performed by: FAMILY MEDICINE

## 2024-04-15 NOTE — PROGRESS NOTES
Chief Complaint  Chief Complaint   Patient presents with    Hot Flashes     1 month f/u        HPI:  Kira Godwin presents to [facility]    The patient is a 39-year-old female who presents for evaluation of multiple medical concerns.    The patient is experiencing persistent pruritus in the vaginal area, which she attributes to prolonged periods of sitting.    The patient has been experiencing leg stiffness, which she attributes to prolonged sitting. In 09/2023, she experienced a sensation akin to pins and needles and a mild burning sensation. Despite engaging in outdoor activities such as hiking, her symptoms returned. She also is experiencing a sensation of heaviness in her upper and lower extremities after prolonged sitting. Her sleep has been disturbed, which she attributes to excessive caffeine intake. She experienced nausea, dizziness, and imbalance last night, which resolved with rest. She has been feeling unwell after consuming a large piece of cake a few hours before bedtime. She also reported feeling sedentary recently.    The patient has irregular menstrual cycles without heavy bleeding. Last year, her menstrual cycle went from 22 days to 42 days. She has also noticed small amounts of body hair. She experienced a burning sensation from head to toe after starting steroids, which resolved after approximately 30 minutes. This occurred twice within a few months. She denies hot flashes. She is currently on a regimen of B complex.    The patient occasionally experiences dysphagia, characterized by a gurgling sensation. She also experiences dysphagia with meat and juice, leading to choking. She denies experiencing gastroesophageal reflux or heartburn.    Procedures     Past History:  Medical History: has a past medical history of Sleep apnea.   Surgical History: has no past surgical history on file.   Family History: family history includes Hypertension in her mother; Kidney  "disease in her mother; Stroke in her mother.   Social History: reports that she has never smoked. She has never been exposed to tobacco smoke. She has never used smokeless tobacco. She reports that she does not drink alcohol and does not use drugs.  Immunization History   Administered Date(s) Administered    COVID-19 (PFIZER) BIVALENT 12+YRS 10/17/2022    COVID-19 (PFIZER) Purple Cap Monovalent 04/13/2021, 05/04/2021, 11/19/2021    COVID-19 F23 (PFIZER) 12YRS+ (COMIRNATY) 10/23/2023         Allergies: Methylprednisolone     Medications:  Current Outpatient Medications on File Prior to Visit   Medication Sig Dispense Refill    cholecalciferol (VITAMIN D3) 25 MCG (1000 UT) tablet Take 1 tablet by mouth Daily.      L-Theanine 200 MG capsule       Magnesium 200 MG chewable tablet       Omega-3 Fatty Acids (OMEGA 3 500 PO) Take 1 capsule by mouth Daily.      [DISCONTINUED] B Complex-C (B-complex with vitamin C) tablet Take 1 tablet by mouth Daily.      [DISCONTINUED] vitamin B-12 (CYANOCOBALAMIN) 1000 MCG tablet Take 1 tablet by mouth Daily.       No current facility-administered medications on file prior to visit.        Health Maintenance Due   Topic Date Due    TDAP/TD VACCINES (1 - Tdap) Never done    ANNUAL PHYSICAL  Never done       Vital Signs:   Vitals:    04/15/24 1110   BP: 110/66   Pulse: 89   Temp: 98.1 °F (36.7 °C)   SpO2: 98%   Weight: 91.5 kg (201 lb 11.2 oz)   Height: 167.6 cm (66\")      Body mass index is 32.56 kg/m².     ROS:  Review of Systems   Constitutional:  Negative for fatigue and fever.   HENT:  Negative for congestion, ear pain and sinus pressure.    Respiratory:  Negative for cough, chest tightness and shortness of breath.    Cardiovascular:  Negative for chest pain, palpitations and leg swelling.   Gastrointestinal:  Negative for abdominal pain and diarrhea.   Genitourinary:  Negative for dysuria and frequency.   Neurological:  Negative for speech difficulty, headache and confusion. "   Psychiatric/Behavioral:  Negative for agitation and behavioral problems.           Physical Exam  Vitals reviewed.   Constitutional:       Appearance: Normal appearance.   HENT:      Right Ear: Tympanic membrane normal.      Left Ear: Tympanic membrane normal.      Nose: Nose normal.   Eyes:      Extraocular Movements: Extraocular movements intact.      Conjunctiva/sclera: Conjunctivae normal.      Pupils: Pupils are equal, round, and reactive to light.   Cardiovascular:      Rate and Rhythm: Normal rate and regular rhythm.   Pulmonary:      Effort: Pulmonary effort is normal.      Breath sounds: Normal breath sounds.   Abdominal:      General: Bowel sounds are normal.   Musculoskeletal:         General: Normal range of motion.      Cervical back: Normal range of motion.   Skin:     General: Skin is warm and dry.   Neurological:      General: No focal deficit present.      Mental Status: She is alert and oriented to person, place, and time.   Psychiatric:         Mood and Affect: Mood normal.         Behavior: Behavior normal.          Result Review        Diagnoses and all orders for this visit:    1. Paresthesia (Primary)  -     MRI Brain With & Without Contrast; Future    2. Thyromegaly  -     US Thyroid; Future    3. Chronic vaginitis    4. Pharyngoesophageal dysphagia    1. Pruritus in the vaginal area.  - The patient was counseled to avoid using any new detergents or soaps, as fragrances can occasionally lead to skin sensitivity.    2. Paresthesia.  - The patient's laboratory results do not indicate any abnormalities, suggesting a possible nerve condition. Her blood glucose levels were within the normal range during her last evaluation. A brain MRI with contrast will be ordered to exclude the possibility of multiple sclerosis.    3. Hormone changes  - The patient's reported burning sensation could be attributed to steroid use, potentially affecting her hormones. The patient was informed that elevated blood  glucose levels can lead to symptoms such as fatigue, lethargy, vision changes, and tingling in her hands and feet.    4. Dysphagia.  - A thyroid ultrasound will be ordered to rule out any thyroid enlargement. Should the patient experience choking, a swallowing study may be necessary.            Smoking Cessation:    Kira Godwin  reports that she has never smoked. She has never been exposed to tobacco smoke. She has never used smokeless tobacco.        Follow Up   Return in about 3 months (around 7/15/2024) for Annual physical.  Patient was given instructions and counseling regarding her condition or for health maintenance advice. Please see specific information pulled into the AVS if appropriate.       Brianda Meeks MD  Answers submitted by the patient for this visit:  Other (Submitted on 4/13/2024)  Please describe your symptoms.: Follow up for prior issues involving paresthesia  Have you had these symptoms before?: Yes  How long have you been having these symptoms?: Greater than 2 weeks  Please list any medications you are currently taking for this condition.: None  Please describe any probable cause for these symptoms. : Reaction to methylprednisolone  Primary Reason for Visit (Submitted on 4/13/2024)  What is the primary reason for your visit?: Other        Transcribed from ambient dictation for Brianda Meeks MD by Eri Jeong.  04/15/24   12:09 EDT    Patient or patient representative verbalized consent to the visit recording.  I have personally performed the services described in this document as transcribed by the above individual, and it is both accurate and complete.

## 2024-05-10 ENCOUNTER — TELEPHONE (OUTPATIENT)
Dept: FAMILY MEDICINE CLINIC | Facility: CLINIC | Age: 39
End: 2024-05-10
Payer: COMMERCIAL

## 2024-05-16 ENCOUNTER — APPOINTMENT (OUTPATIENT)
Dept: MRI IMAGING | Facility: HOSPITAL | Age: 39
End: 2024-05-16
Payer: COMMERCIAL

## 2024-05-16 ENCOUNTER — HOSPITAL ENCOUNTER (OUTPATIENT)
Dept: ULTRASOUND IMAGING | Facility: HOSPITAL | Age: 39
Discharge: HOME | End: 2024-05-16
Admitting: FAMILY MEDICINE
Payer: COMMERCIAL

## 2024-05-16 DIAGNOSIS — E01.0 THYROMEGALY: ICD-10-CM

## 2024-05-16 PROCEDURE — 76536 US EXAM OF HEAD AND NECK: CPT

## 2024-05-20 ENCOUNTER — HOSPITAL ENCOUNTER (OUTPATIENT)
Dept: MAMMOGRAPHY | Facility: HOSPITAL | Age: 39
Discharge: HOME | End: 2024-05-20
Admitting: FAMILY MEDICINE
Payer: COMMERCIAL

## 2024-05-20 DIAGNOSIS — Z12.31 BREAST CANCER SCREENING BY MAMMOGRAM: ICD-10-CM

## 2024-05-20 PROCEDURE — 77067 SCR MAMMO BI INCL CAD: CPT

## 2024-05-20 PROCEDURE — 77063 BREAST TOMOSYNTHESIS BI: CPT

## 2024-06-03 ENCOUNTER — TELEPHONE (OUTPATIENT)
Dept: FAMILY MEDICINE CLINIC | Facility: CLINIC | Age: 39
End: 2024-06-03

## 2024-06-03 NOTE — TELEPHONE ENCOUNTER
HUB TO READ    Patient to keep scheduled appointment with  Dr. Meeks.  Lab results will be discussed at upcoming visit.

## 2024-06-03 NOTE — TELEPHONE ENCOUNTER
Caller: Kira Godwin    Relationship to patient: Self    Best call back number: [phone]    Chief complaint: REVIEW LABS     Type of visit: OFFICE VISIT     Requested date: 6-3-24    Additional notes:PATIENT IS REQUESTING TO BE SEEN BY DOCTOR ALDO IN THE NEXT WEEK.     HUB HAD AVAILABILITY

## 2024-06-25 ENCOUNTER — TELEPHONE (OUTPATIENT)
Dept: FAMILY MEDICINE CLINIC | Facility: CLINIC | Age: 39
End: 2024-06-25
Payer: COMMERCIAL

## 2024-06-25 NOTE — TELEPHONE ENCOUNTER
Caller: LAMAR     Relationship:   [facility]     Best call back number:  [phone]  EXT# [id number]     Who are you requesting to speak with (clinical staff, provider,  specific staff member):  CLINICAL     What was the call regarding:   Patient's MRI was denied so referral was sent to [facility]. This will need either a Peer to Peer or some sort of appeal. Patient has [employer]. Lamar will need a call back today, as patient is scheduled for MRI on 27th.

## 2024-06-25 NOTE — TELEPHONE ENCOUNTER
Referral department verified with Dr. Meeks that MRI is not emergent and that it can wait the 60 days in order for it to be completed at [place]

## 2024-07-08 DIAGNOSIS — E01.0 THYROMEGALY: Primary | ICD-10-CM

## 2024-07-08 DIAGNOSIS — R22.2 CHEST MASS: ICD-10-CM

## 2024-07-10 ENCOUNTER — HOSPITAL ENCOUNTER (OUTPATIENT)
Dept: CT IMAGING | Facility: HOSPITAL | Age: 39
Discharge: HOME | End: 2024-07-10
Admitting: FAMILY MEDICINE
Payer: COMMERCIAL

## 2024-07-10 DIAGNOSIS — E01.0 THYROMEGALY: ICD-10-CM

## 2024-07-10 DIAGNOSIS — R22.2 CHEST MASS: ICD-10-CM

## 2024-07-10 PROCEDURE — 25510000001 IOPAMIDOL PER 1 ML: Performed by: FAMILY MEDICINE

## 2024-07-10 PROCEDURE — 71260 CT THORAX DX C+: CPT

## 2024-07-10 RX ADMIN — IOPAMIDOL 85 ML: 755 INJECTION, SOLUTION INTRAVENOUS at 16:54

## 2024-07-16 ENCOUNTER — OFFICE VISIT (OUTPATIENT)
Dept: FAMILY MEDICINE CLINIC | Facility: CLINIC | Age: 39
End: 2024-07-16
Payer: COMMERCIAL

## 2024-07-16 VITALS
SYSTOLIC BLOOD PRESSURE: 100 MMHG | WEIGHT: 203 LBS | HEIGHT: 66 IN | BODY MASS INDEX: 32.62 KG/M2 | DIASTOLIC BLOOD PRESSURE: 72 MMHG | TEMPERATURE: 98.1 F | HEART RATE: 81 BPM | OXYGEN SATURATION: 98 %

## 2024-07-16 DIAGNOSIS — R22.2 CHEST MASS: ICD-10-CM

## 2024-07-16 DIAGNOSIS — H61.22 IMPACTED CERUMEN OF LEFT EAR: Primary | ICD-10-CM

## 2024-07-16 DIAGNOSIS — E04.1 THYROID NODULE: ICD-10-CM

## 2024-07-16 PROCEDURE — 99214 OFFICE O/P EST MOD 30 MIN: CPT | Performed by: FAMILY MEDICINE

## 2024-07-16 PROCEDURE — 1126F AMNT PAIN NOTED NONE PRSNT: CPT | Performed by: FAMILY MEDICINE

## 2024-07-16 NOTE — PROGRESS NOTES
Chief Complaint  Annual Exam, Thyroid Problem, Dizziness, Ear Fullness, and Vaginal Itching    Subjective        Kira Godwin presents to [facility]  History of Present Illness  The patient presents for evaluation of multiple medical concerns.    She experienced vertigo a few weeks ago. Occasionally, she experiences a sensation of fluid in her ears and tinnitus when she turns her neck or lies in certain ways. Recently, she experienced severe vertigo, which led her to lie down for a few hours. She also experienced a severe headache, resembling a migraine, which she suspects might be due to a mass in her throat. She experienced similar symptoms in the past, which she attributes to her thyroid medication and medication. She was informed that the next step might be a biopsy, but she has not yet scheduled it. She has not yet consulted a specialist. She experiences pressure in her ears during simple movements, such as sitting and talking, but denies any sinus drainage. She suspects her poor posture at her desk may be contributing to her symptoms, as she feels a tightness in the back of her neck when moving her head up and down. She denies any neck injuries or whiplash. She sleeps on her left side.    Supplemental Information  She still has some itchiness in the vaginal area, but it is not as bad as it was last time, but it is still lingering.    Past Medical History:   Diagnosis Date    Sleep apnea       Family History   Problem Relation Age of Onset    Stroke Mother     Hypertension Mother     Kidney disease Mother       Social History     Socioeconomic History    Marital status: Single   Tobacco Use    Smoking status: Never     Passive exposure: Never    Smokeless tobacco: Never   Vaping Use    Vaping status: Some Days    Substances: Nicotine    Devices: Disposable   Substance and Sexual Activity    Alcohol use: Never    Drug use: Never    Sexual activity: Defer      Objective  "  Vital Signs:  /72 (BP Location: Left arm, Patient Position: Sitting, Cuff Size: Adult)   Pulse 81   Temp 98.1 °F (36.7 °C) (Oral)   Ht 167.6 cm (66\")   Wt 92.1 kg (203 lb)   SpO2 98%   BMI 32.77 kg/m²   Estimated body mass index is 32.77 kg/m² as calculated from the following:    Height as of this encounter: 167.6 cm (66\").    Weight as of this encounter: 92.1 kg (203 lb).         Review of Systems   Constitutional:  Negative for activity change, chills, fatigue and fever.   HENT:  Positive for trouble swallowing. Negative for congestion, sinus pressure, sinus pain and sore throat.    Eyes:  Negative for discharge and redness.   Respiratory:  Positive for choking. Negative for cough and chest tightness.    Cardiovascular:  Negative for chest pain, palpitations and leg swelling.   Gastrointestinal:  Negative for abdominal pain and diarrhea.   Endocrine: Negative for cold intolerance and heat intolerance.   Genitourinary:  Negative for difficulty urinating and dysuria.   Musculoskeletal:  Negative for gait problem and neck stiffness.   Skin:  Negative for pallor and rash.   Neurological:  Negative for dizziness and headaches.   Psychiatric/Behavioral:  Negative for agitation, behavioral problems and confusion.         Physical Exam  Right ear has minimal wax. Left ear exhibits significant wax, obscuring visibility of the eardrum.  Lungs are clear.  Heart sounds are normal.      Result Review     Physical Exam  Vitals reviewed.   Constitutional:       Appearance: Normal appearance.   HENT:      Right Ear: Tympanic membrane normal.      Left Ear: Tympanic membrane normal.      Nose: Nose normal.   Eyes:      Extraocular Movements: Extraocular movements intact.      Conjunctiva/sclera: Conjunctivae normal.      Pupils: Pupils are equal, round, and reactive to light.   Cardiovascular:      Rate and Rhythm: Normal rate and regular rhythm.   Pulmonary:      Effort: Pulmonary effort is normal.      Breath " sounds: Normal breath sounds.   Abdominal:      General: Bowel sounds are normal.   Musculoskeletal:         General: Normal range of motion.      Cervical back: Normal range of motion.   Skin:     General: Skin is warm and dry.   Neurological:      General: No focal deficit present.      Mental Status: She is alert and oriented to person, place, and time.   Psychiatric:         Mood and Affect: Mood normal.         Behavior: Behavior normal.        Results  Laboratory Studies  Inflammatory cells were slightly lower. Thyroid labs were normal.    Imaging  Chest CT scan showed a mass under the thyroid gland of about 5 to 8 cm, similar to previous results.             Assessment and Plan     Diagnoses and all orders for this visit:    1. Impacted cerumen of left ear (Primary)  -     carbamide peroxide (Debrox) 6.5 % otic solution; Administer 5 drops into the left ear 2 (Two) Times a Day for 3 days.  Dispense: 15 mL; Refill: 0    2. Thyroid nodule    3. Chest mass      Assessment & Plan  1. Thyroid mass.  A thyroid ultrasound revealed a mass beneath her thymus gland. A chest CT scan was recommended for further evaluation. The mass, approximately 5 to 8 cm, is located below the thyroid gland, similar to the previous results. The cause of the mass is difficult to determine. A nonemergent biopsy was recommended as the mass did not appear to be cancerous. Her inflammatory cells were slightly lower, but her thyroid labs were normal. The mass could be due to an overactive thyroid gland, which may be affecting her immune system. Her symptoms, including dizziness, vertigo, aches in her arms and legs, and muscle weakness, are neurological in nature. The tightness in her neck could be due to a muscle spasm. A referral to Dr. Scanlon or Dr. Andrews, an ENT specialist, will be made. A brain MRI will be scheduled. A prescription for ear drops will be provided to clear the wax. A yeast medication will also be prescribed.    Follow-up  A  follow-up visit is scheduled in 3 months.       I spent 35 minutes caring for Kira on this date of service. This time includes time spent by me in the following activities:reviewing tests  Follow Up  Return in about 3 months (around 10/16/2024).  Patient was given instructions and counseling regarding her condition or for health maintenance advice. Please see specific information pulled into the AVS if appropriate.   Patient or patient representative verbalized consent for the use of Ambient Listening during the visit with  Brianda Meeks MD for chart documentation. 7/16/2024  15:53 EDT    Brianda Meeks MD

## 2024-07-17 ENCOUNTER — TELEPHONE (OUTPATIENT)
Dept: FAMILY MEDICINE CLINIC | Facility: CLINIC | Age: 39
End: 2024-07-17
Payer: COMMERCIAL

## 2024-07-17 DIAGNOSIS — E01.0 THYROMEGALY: ICD-10-CM

## 2024-07-17 DIAGNOSIS — R22.1 MASS OF LEFT SIDE OF NECK: Primary | ICD-10-CM

## 2024-07-17 DIAGNOSIS — E04.1 THYROID NODULE: Primary | ICD-10-CM

## 2024-07-17 DIAGNOSIS — R13.14 PHARYNGOESOPHAGEAL DYSPHAGIA: ICD-10-CM

## 2024-07-17 NOTE — TELEPHONE ENCOUNTER
Called and informed patient about my discussion last night with Dr. Andrews ENT about the thyroid gland and mass which he feels may be an extension of the thyroid gland.  We will proceed with the US guided biopsy then ENT but if the results are more concerning for cancer we may have to go to Cardiothoracic surgeon.  I am placing orders now for biopsy.

## 2024-07-23 ENCOUNTER — HOSPITAL ENCOUNTER (OUTPATIENT)
Dept: ULTRASOUND IMAGING | Facility: HOSPITAL | Age: 39
Discharge: HOME | End: 2024-07-23
Admitting: FAMILY MEDICINE
Payer: COMMERCIAL

## 2024-07-23 DIAGNOSIS — R22.1 MASS OF LEFT SIDE OF NECK: ICD-10-CM

## 2024-07-23 PROCEDURE — 25010000002 LIDOCAINE 1 % SOLUTION

## 2024-07-23 RX ORDER — LIDOCAINE HYDROCHLORIDE 10 MG/ML
INJECTION, SOLUTION INFILTRATION; PERINEURAL
Status: COMPLETED
Start: 2024-07-23 | End: 2024-07-23

## 2024-07-23 RX ADMIN — LIDOCAINE HYDROCHLORIDE: 10 INJECTION, SOLUTION INFILTRATION; PERINEURAL at 13:27

## 2024-07-24 LAB — Lab: NORMAL

## 2024-07-29 LAB
CYTO UR: NORMAL
LAB AP CASE REPORT: NORMAL
LAB AP CLINICAL INFORMATION: NORMAL
LAB AP DIAGNOSIS COMMENT: NORMAL
LAB AP SPECIAL STAINS: NORMAL
PATH REPORT.FINAL DX SPEC: NORMAL
PATH REPORT.GROSS SPEC: NORMAL

## 2024-07-30 ENCOUNTER — PATIENT MESSAGE (OUTPATIENT)
Dept: FAMILY MEDICINE CLINIC | Facility: CLINIC | Age: 39
End: 2024-07-30
Payer: COMMERCIAL

## 2024-08-01 ENCOUNTER — OFFICE VISIT (OUTPATIENT)
Dept: FAMILY MEDICINE CLINIC | Facility: CLINIC | Age: 39
End: 2024-08-01
Payer: COMMERCIAL

## 2024-08-01 VITALS
BODY MASS INDEX: 33.04 KG/M2 | HEART RATE: 83 BPM | HEIGHT: 66 IN | SYSTOLIC BLOOD PRESSURE: 124 MMHG | DIASTOLIC BLOOD PRESSURE: 75 MMHG | OXYGEN SATURATION: 98 % | TEMPERATURE: 98 F | RESPIRATION RATE: 18 BRPM | WEIGHT: 205.6 LBS

## 2024-08-01 DIAGNOSIS — E07.9 THYROID MASS: Primary | ICD-10-CM

## 2024-08-01 PROCEDURE — 99213 OFFICE O/P EST LOW 20 MIN: CPT | Performed by: FAMILY MEDICINE

## 2024-08-01 PROCEDURE — 1126F AMNT PAIN NOTED NONE PRSNT: CPT | Performed by: FAMILY MEDICINE

## 2024-08-01 NOTE — PROGRESS NOTES
"Chief Complaint  Results    Subjective        Kira Godwin presents to [facility]  History of Present Illness  The patient presents for evaluation of her fine needle aspiration results.    ENT has reviewed her MyChart and decided to refer. A consultation with a Oncology surgeon specialist is scheduled for 09/18/2024.  Pt is very nervous about the upcoming procedure. The results of the tissue biopsy is that it is abnormal thyroid tissue.      Past Medical History:   Diagnosis Date    Sleep apnea       Family History   Problem Relation Age of Onset    Stroke Mother     Hypertension Mother     Kidney disease Mother       Social History     Socioeconomic History    Marital status: Single   Tobacco Use    Smoking status: Never     Passive exposure: Never    Smokeless tobacco: Never   Vaping Use    Vaping status: Some Days    Substances: Nicotine    Devices: Disposable   Substance and Sexual Activity    Alcohol use: Never    Drug use: Never    Sexual activity: Defer      Objective   Vital Signs:  /75 (BP Location: Right arm, Patient Position: Sitting, Cuff Size: Adult)   Pulse 83   Temp 98 °F (36.7 °C) (Oral)   Resp 18   Ht 167.6 cm (66\")   Wt 93.3 kg (205 lb 9.6 oz)   SpO2 98%   BMI 33.18 kg/m²   Estimated body mass index is 33.18 kg/m² as calculated from the following:    Height as of this encounter: 167.6 cm (66\").    Weight as of this encounter: 93.3 kg (205 lb 9.6 oz).         Review of Systems   Constitutional:  Negative for activity change, chills, fatigue and fever.   HENT:  Negative for congestion, sinus pressure, sinus pain and sore throat.    Eyes:  Negative for discharge and redness.   Respiratory:  Negative for cough and chest tightness.    Cardiovascular:  Negative for chest pain, palpitations and leg swelling.   Gastrointestinal:  Negative for abdominal pain and diarrhea.   Endocrine: Negative for cold intolerance and heat intolerance.   Genitourinary:  " Negative for difficulty urinating and dysuria.   Musculoskeletal:  Negative for gait problem and neck stiffness.   Skin:  Negative for pallor and rash.   Neurological:  Negative for dizziness and headaches.   Psychiatric/Behavioral:  Negative for agitation, behavioral problems and confusion.       Physical Exam  Vitals reviewed.   Constitutional:       Appearance: Normal appearance.   HENT:      Right Ear: Tympanic membrane normal.      Left Ear: Tympanic membrane normal.      Nose: Nose normal.   Eyes:      Extraocular Movements: Extraocular movements intact.      Conjunctiva/sclera: Conjunctivae normal.      Pupils: Pupils are equal, round, and reactive to light.   Neck:      Thyroid: Thyroid mass and thyromegaly present.   Cardiovascular:      Rate and Rhythm: Normal rate and regular rhythm.   Pulmonary:      Effort: Pulmonary effort is normal.      Breath sounds: Normal breath sounds.   Abdominal:      General: Bowel sounds are normal.   Musculoskeletal:         General: Normal range of motion.      Cervical back: Normal range of motion.   Skin:     General: Skin is warm and dry.   Neurological:      General: No focal deficit present.      Mental Status: She is alert and oriented to person, place, and time.   Psychiatric:         Mood and Affect: Mood normal.         Behavior: Behavior normal.        Physical Exam        Result Review       Results  Laboratory Studies  Fine needle aspiration test showed abnormal cells.             Assessment and Plan     Diagnoses and all orders for this visit:    1. Thyroid mass (Primary)      Assessment & Plan  1. Thyroid mass.  The fine needle aspiration results indicated abnormal cells, which places her at a higher risk for cancer. The possibility of repeating the fine needle aspiration and removing the tissue were discussed. A referral to a surgeon has already been arranged. Genetic marker testing is being conducted.       I spent 15 minutes caring for Kira on this date  of service. This time includes time spent by me in the following activities:reviewing tests  Follow Up  Return in about 4 weeks (around 8/29/2024).  Patient was given instructions and counseling regarding her condition or for health maintenance advice. Please see specific information pulled into the AVS if appropriate.   Patient or patient representative verbalized consent for the use of Ambient Listening during the visit with  Brianda Meeks MD for chart documentation. 8/18/2024  10:15 EDT    Brianda Meeks MD      Answers submitted by the patient for this visit:  Other (Submitted on 7/30/2024)  Please describe your symptoms.: Follow up  Have you had these symptoms before?: Yes  How long have you been having these symptoms?: Greater than 2 weeks  Please list any medications you are currently taking for this condition.: None  Please describe any probable cause for these symptoms. : God only knows  Primary Reason for Visit (Submitted on 7/30/2024)  What is the primary reason for your visit?: Other

## 2024-08-10 LAB
AFP INTERP SERPL-IMP: NORMAL
ALGORITHM: NORMAL
LOCATION: NORMAL
Lab: NORMAL
MUTATIONS ANALYZED: NORMAL
REGULATORY: NORMAL
RESULTS SUMMARY - SOURCE: NORMAL
RISK ASSESSMENT EXPLANATION: NORMAL
RISK CATEGORY*: NORMAL
SIGNATURE: NORMAL
THYGENEXT ONCOGENE PANEL: NORMAL
THYRAMIR(TM) MIRNA CLASSIFIER: NEGATIVE
THYRAMIR(TM) REFLEX STATUS: NORMAL

## 2024-10-18 ENCOUNTER — OFFICE VISIT (OUTPATIENT)
Dept: FAMILY MEDICINE CLINIC | Facility: CLINIC | Age: 39
End: 2024-10-18
Payer: COMMERCIAL

## 2024-10-18 VITALS
HEART RATE: 77 BPM | SYSTOLIC BLOOD PRESSURE: 110 MMHG | TEMPERATURE: 98.3 F | HEIGHT: 66 IN | WEIGHT: 206 LBS | BODY MASS INDEX: 33.11 KG/M2 | DIASTOLIC BLOOD PRESSURE: 82 MMHG | OXYGEN SATURATION: 99 %

## 2024-10-18 DIAGNOSIS — R21 RASH: Primary | ICD-10-CM

## 2024-10-18 DIAGNOSIS — E89.0 POSTABLATIVE HYPOTHYROIDISM: ICD-10-CM

## 2024-10-18 DIAGNOSIS — R89.9 ABNORMAL THYROID BIOPSY: ICD-10-CM

## 2024-10-18 LAB
ALBUMIN SERPL-MCNC: 3.7 G/DL (ref 3.5–5.2)
ALBUMIN/GLOB SERPL: 0.9 G/DL
ALP SERPL-CCNC: 77 U/L (ref 39–117)
ALT SERPL W P-5'-P-CCNC: 16 U/L (ref 1–33)
ANION GAP SERPL CALCULATED.3IONS-SCNC: 11.4 MMOL/L (ref 5–15)
AST SERPL-CCNC: 18 U/L (ref 1–32)
BILIRUB SERPL-MCNC: 0.3 MG/DL (ref 0–1.2)
BUN SERPL-MCNC: 8 MG/DL (ref 6–20)
BUN/CREAT SERPL: 12.1 (ref 7–25)
CALCIUM SPEC-SCNC: 9 MG/DL (ref 8.6–10.5)
CHLORIDE SERPL-SCNC: 106 MMOL/L (ref 98–107)
CO2 SERPL-SCNC: 21.6 MMOL/L (ref 22–29)
CREAT SERPL-MCNC: 0.66 MG/DL (ref 0.57–1)
EGFRCR SERPLBLD CKD-EPI 2021: 113.2 ML/MIN/1.73
GLOBULIN UR ELPH-MCNC: 4 GM/DL
GLUCOSE SERPL-MCNC: 76 MG/DL (ref 65–99)
POTASSIUM SERPL-SCNC: 3.9 MMOL/L (ref 3.5–5.2)
PROT SERPL-MCNC: 7.7 G/DL (ref 6–8.5)
PTH-INTACT SERPL-MCNC: 31.1 PG/ML (ref 15–65)
SODIUM SERPL-SCNC: 139 MMOL/L (ref 136–145)
TSH SERPL DL<=0.05 MIU/L-ACNC: 10.2 UIU/ML (ref 0.27–4.2)

## 2024-10-18 PROCEDURE — 84443 ASSAY THYROID STIM HORMONE: CPT | Performed by: FAMILY MEDICINE

## 2024-10-18 PROCEDURE — 1160F RVW MEDS BY RX/DR IN RCRD: CPT | Performed by: FAMILY MEDICINE

## 2024-10-18 PROCEDURE — 80053 COMPREHEN METABOLIC PANEL: CPT | Performed by: FAMILY MEDICINE

## 2024-10-18 PROCEDURE — 1159F MED LIST DOCD IN RCRD: CPT | Performed by: FAMILY MEDICINE

## 2024-10-18 PROCEDURE — 86800 THYROGLOBULIN ANTIBODY: CPT | Performed by: FAMILY MEDICINE

## 2024-10-18 PROCEDURE — 1126F AMNT PAIN NOTED NONE PRSNT: CPT | Performed by: FAMILY MEDICINE

## 2024-10-18 PROCEDURE — 86376 MICROSOMAL ANTIBODY EACH: CPT | Performed by: FAMILY MEDICINE

## 2024-10-18 PROCEDURE — 99214 OFFICE O/P EST MOD 30 MIN: CPT | Performed by: FAMILY MEDICINE

## 2024-10-18 PROCEDURE — 83970 ASSAY OF PARATHORMONE: CPT | Performed by: FAMILY MEDICINE

## 2024-10-18 RX ORDER — TRIAMCINOLONE ACETONIDE 1 MG/G
1 OINTMENT TOPICAL 2 TIMES DAILY
Qty: 30 G | Refills: 0 | Status: SHIPPED | OUTPATIENT
Start: 2024-10-18 | End: 2024-11-01

## 2024-10-18 RX ORDER — LEVOTHYROXINE SODIUM 125 UG/1
125 TABLET ORAL DAILY
COMMUNITY
Start: 2024-09-26 | End: 2024-10-26

## 2024-10-18 NOTE — PROGRESS NOTES
Chief Complaint  Labs Only (Calcium check/)    Subjective        Kira Godwin presents to [facility]  History of Present Illness  The patient presents for evaluation of multiple medical concerns.    She underwent surgery a few weeks ago, which she reports went well with minimal pain. She had a postoperative visit a couple of days ago and is responding positively to her medications. Initially, she experienced bouts of fatigue, but these have since subsided. The pathology report from her surgery showed no malignancy. She does not currently have an endocrinologist. Her bowel movements are regular.    Recently, she has been experiencing voice strain due to increased talking and watching TV with her grandmother. She also reports difficulty speaking without running out of breath. She experienced heartburn after the surgery, which resolved after modifying her activities. She occasionally experiences chest fluttering when leaning down or back, which she believes is not related to her heart but possibly her esophagus. This sensation is not painful, and she has noticed it more frequently recently, along with an increase in acid or phlegm.    She has been dealing with a dry, itchy rash on her right shoulder for some time. The rash sometimes cracks and bleeds, and occasionally forms dry patches that do not itch. The rash almost completely heals but then recurs, particularly after washing the area excessively. She has tried switching to a sensitive detergent and uses body lotion only on her feet and hands. She also experiences itchiness in her buttocks.        Medical History: has a past medical history of Sleep apnea.   Surgical History: has a past surgical history that includes Thyroidectomy.   Family History: family history includes Hypertension in her mother; Kidney disease in her mother; Stroke in her mother.   Social History: reports that she has never smoked. She has never been exposed  "to tobacco smoke. She has never used smokeless tobacco. She reports that she does not drink alcohol and does not use drugs.  Immunization History   Administered Date(s) Administered    COVID-19 (PFIZER) 12YRS+ (COMIRNATY) 10/23/2023    COVID-19 (PFIZER) BIVALENT 12+YRS 10/17/2022    COVID-19 (PFIZER) Purple Cap Monovalent 04/13/2021, 05/04/2021, 11/19/2021       Objective   Vital Signs:  /82 (BP Location: Left arm, Patient Position: Sitting, Cuff Size: Adult)   Pulse 77   Temp 98.3 °F (36.8 °C) (Oral)   Ht 167.6 cm (66\")   Wt 93.4 kg (206 lb)   SpO2 99%   BMI 33.25 kg/m²   Estimated body mass index is 33.25 kg/m² as calculated from the following:    Height as of this encounter: 167.6 cm (66\").    Weight as of this encounter: 93.4 kg (206 lb).             ROS:  Review of Systems   Constitutional:  Negative for fatigue and fever.   HENT:  Negative for congestion, ear pain and sinus pressure.    Respiratory:  Negative for cough, chest tightness and shortness of breath.    Cardiovascular:  Negative for chest pain, palpitations and leg swelling.   Gastrointestinal:  Negative for abdominal pain and diarrhea.   Genitourinary:  Negative for dysuria and frequency.   Neurological:  Negative for speech difficulty, headache and confusion.   Psychiatric/Behavioral:  Negative for agitation and behavioral problems.       Physical Exam  Vitals reviewed.   Constitutional:       Appearance: Normal appearance.   HENT:      Right Ear: Tympanic membrane normal.      Left Ear: Tympanic membrane normal.      Nose: Nose normal.   Eyes:      Extraocular Movements: Extraocular movements intact.      Conjunctiva/sclera: Conjunctivae normal.      Pupils: Pupils are equal, round, and reactive to light.   Cardiovascular:      Rate and Rhythm: Normal rate and regular rhythm.   Pulmonary:      Effort: Pulmonary effort is normal.      Breath sounds: Normal breath sounds.   Abdominal:      General: Bowel sounds are normal. "   Musculoskeletal:         General: Normal range of motion.      Cervical back: Normal range of motion.   Skin:     General: Skin is warm and dry.   Neurological:      General: No focal deficit present.      Mental Status: She is alert and oriented to person, place, and time.   Psychiatric:         Mood and Affect: Mood normal.         Behavior: Behavior normal.       Physical Exam  Heart sounds are normal. No skips, no arrhythmias.      Result Review     The following data was reviewed by: Brianda Meeks MD on 10/18/2024:  Common labs          9/19/2024    09:33 9/26/2024    17:04 10/18/2024    10:56   Common Labs   Glucose   76    BUN   8    Creatinine   0.66    Sodium   139    Potassium   3.9    Chloride   106    Calcium  8.6     9.0    Albumin   3.7    Total Bilirubin   0.3    Alkaline Phosphatase   77    AST (SGOT)   18    ALT (SGPT)   16    WBC 8.44         Hemoglobin 13.9         Hematocrit 41.7         Platelets 382            Details          This result is from an external source.             Results  Laboratory Studies  Calcium levels were good. Blood sugar levels were good.             Assessment and Plan    Diagnoses and all orders for this visit:    1. Rash (Primary)  -     triamcinolone (KENALOG) 0.1 % ointment; Apply 1 Application topically to the appropriate area as directed 2 (Two) Times a Day for 14 days.  Dispense: 30 g; Refill: 0    2. Postablative hypothyroidism  -     Comprehensive Metabolic Panel  -     TSH  -     PTH, Intact  -     Ambulatory Referral to Endocrinology    3. Abnormal thyroid biopsy  -     Ambulatory Referral to Endocrinology      Assessment & Plan  1. Postoperative status.  Her calcium and blood sugar levels were within normal range during her last lab check approximately a month ago. An EKG was performed prior to her surgery, which did not reveal any abnormalities. A referral to an endocrinologist will be made to ensure continuity of care with the surgeon's team. Labs  will be ordered today to recheck her calcium and blood sugar levels.    2. Eczema.  She was advised to avoid using soaps that may cause skin dryness and instead opt for moisturizing, hypoallergenic, or natural soaps. A prescription for a steroid cream ointment will be provided to apply as needed to alleviate itching and inflammation.    3. Gastroesophageal Reflux Disease (GERD).  She has experienced increased acid and phlegm recently, which may be contributing to esophageal spasms. This could be related to her recent surgery and healing process. She was advised to monitor her symptoms and consider dietary modifications to manage reflux.         I spent 35 minutes caring for Kira on this date of service. This time includes time spent by me in the following activities:reviewing tests  Follow Up  Return in about 3 months (around 1/18/2025).  Patient was given instructions and counseling regarding her condition or for health maintenance advice. Please see specific information pulled into the AVS if appropriate.   Patient or patient representative verbalized consent for the use of Ambient Listening during the visit with  Brianda Meeks MD for chart documentation. 10/28/2024  22:18 EDT    Brianda Meeks MD      Answers submitted by the patient for this visit:  Other (Submitted on 10/11/2024)  Please describe your symptoms.: Post surgical follow up  Have you had these symptoms before?: No  How long have you been having these symptoms?: Greater than 2 weeks  Please list any medications you are currently taking for this condition.: Levothyroxine  Please describe any probable cause for these symptoms. : Thyroidectomy  Primary Reason for Visit (Submitted on 10/11/2024)  What is the primary reason for your visit?: Problem Not Listed

## 2024-10-21 LAB
THYROGLOB AB SERPL-ACNC: <1 IU/ML (ref 0–0.9)
THYROPEROXIDASE AB SERPL-ACNC: 13 IU/ML (ref 0–34)

## 2024-11-29 DIAGNOSIS — R21 RASH: ICD-10-CM

## 2024-12-02 RX ORDER — TRIAMCINOLONE ACETONIDE 1 MG/G
1 OINTMENT TOPICAL 2 TIMES DAILY
Qty: 30 G | Refills: 0 | Status: SHIPPED | OUTPATIENT
Start: 2024-12-02 | End: 2024-12-16

## 2024-12-12 ENCOUNTER — APPOINTMENT (OUTPATIENT)
Dept: GENERAL RADIOLOGY | Facility: HOSPITAL | Age: 39
End: 2024-12-12
Payer: COMMERCIAL

## 2024-12-12 ENCOUNTER — HOSPITAL ENCOUNTER (EMERGENCY)
Facility: HOSPITAL | Age: 39
Discharge: HOME | End: 2024-12-12
Attending: EMERGENCY MEDICINE
Payer: COMMERCIAL

## 2024-12-12 VITALS
OXYGEN SATURATION: 100 % | BODY MASS INDEX: 33.13 KG/M2 | HEIGHT: 66 IN | DIASTOLIC BLOOD PRESSURE: 82 MMHG | HEART RATE: 82 BPM | TEMPERATURE: 98.2 F | WEIGHT: 206.13 LBS | RESPIRATION RATE: 16 BRPM | SYSTOLIC BLOOD PRESSURE: 110 MMHG

## 2024-12-12 DIAGNOSIS — S62.174A: Primary | ICD-10-CM

## 2024-12-12 PROCEDURE — 73030 X-RAY EXAM OF SHOULDER: CPT

## 2024-12-12 PROCEDURE — 73110 X-RAY EXAM OF WRIST: CPT

## 2024-12-12 PROCEDURE — 99283 EMERGENCY DEPT VISIT LOW MDM: CPT

## 2024-12-12 PROCEDURE — 71101 X-RAY EXAM UNILAT RIBS/CHEST: CPT
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
## 2024-12-12 NOTE — DISCHARGE INSTRUCTIONS
Thank you for allowing us to provide care for you today.  Your workup today revealed evidence of a right wrist fracture.  Please follow attached instructions along with verbal instructions provided at bedside related to plan management.  Please return to the ED in the event you develop new onset imbalance, inability to speak, slurring of words, change in vision or hearing, severe headache.  Thank you

## 2024-12-13 ENCOUNTER — TELEPHONE (OUTPATIENT)
Dept: ORTHOPEDIC SURGERY | Facility: CLINIC | Age: 39
End: 2024-12-13
Payer: COMMERCIAL

## 2024-12-20 ENCOUNTER — OFFICE VISIT (OUTPATIENT)
Dept: ORTHOPEDIC SURGERY | Facility: CLINIC | Age: 39
End: 2024-12-20
Payer: COMMERCIAL

## 2024-12-20 VITALS
HEIGHT: 66 IN | HEART RATE: 88 BPM | DIASTOLIC BLOOD PRESSURE: 78 MMHG | OXYGEN SATURATION: 95 % | WEIGHT: 206 LBS | BODY MASS INDEX: 33.11 KG/M2 | SYSTOLIC BLOOD PRESSURE: 110 MMHG

## 2024-12-20 DIAGNOSIS — S62.174A CLOSED NONDISPLACED FRACTURE OF TRAPEZIUM OF RIGHT WRIST, INITIAL ENCOUNTER: Primary | ICD-10-CM
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
## 2025-01-20 ENCOUNTER — OFFICE VISIT (OUTPATIENT)
Dept: FAMILY MEDICINE CLINIC | Facility: CLINIC | Age: 40
End: 2025-01-20
Payer: COMMERCIAL

## 2025-01-20 ENCOUNTER — OFFICE VISIT (OUTPATIENT)
Dept: ORTHOPEDIC SURGERY | Facility: CLINIC | Age: 40
End: 2025-01-20
Payer: COMMERCIAL

## 2025-01-20 VITALS
WEIGHT: 205.91 LBS | SYSTOLIC BLOOD PRESSURE: 140 MMHG | DIASTOLIC BLOOD PRESSURE: 82 MMHG | OXYGEN SATURATION: 97 % | BODY MASS INDEX: 33.09 KG/M2 | HEART RATE: 82 BPM | HEIGHT: 66 IN

## 2025-01-20 VITALS
TEMPERATURE: 98.5 F | HEART RATE: 82 BPM | HEIGHT: 66 IN | SYSTOLIC BLOOD PRESSURE: 110 MMHG | OXYGEN SATURATION: 97 % | WEIGHT: 204 LBS | BODY MASS INDEX: 32.78 KG/M2 | DIASTOLIC BLOOD PRESSURE: 72 MMHG

## 2025-01-20 DIAGNOSIS — L30.9 ECZEMA, UNSPECIFIED TYPE: ICD-10-CM

## 2025-01-20 DIAGNOSIS — E89.0 POSTABLATIVE HYPOTHYROIDISM: Primary | ICD-10-CM

## 2025-01-20 DIAGNOSIS — M79.642 HAND PAIN, LEFT: ICD-10-CM

## 2025-01-20 DIAGNOSIS — S62.174D CLOSED NONDISPLACED FRACTURE OF TRAPEZIUM OF RIGHT WRIST WITH ROUTINE HEALING, SUBSEQUENT ENCOUNTER: Primary | ICD-10-CM

## 2025-01-20 PROCEDURE — 99214 OFFICE O/P EST MOD 30 MIN: CPT | Performed by: FAMILY MEDICINE

## 2025-01-20 PROCEDURE — 99024 POSTOP FOLLOW-UP VISIT: CPT | Performed by: PHYSICIAN ASSISTANT

## 2025-01-20 RX ORDER — LEVOTHYROXINE SODIUM 125 UG/1
125 TABLET ORAL DAILY
Qty: 30 TABLET | Refills: 2 | Status: SHIPPED | OUTPATIENT
Start: 2025-01-20 | End: 2025-02-19
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
## 2025-03-03 ENCOUNTER — OFFICE VISIT (OUTPATIENT)
Dept: ORTHOPEDIC SURGERY | Facility: CLINIC | Age: 40
End: 2025-03-03
Payer: COMMERCIAL

## 2025-03-03 VITALS
HEIGHT: 66 IN | WEIGHT: 203.93 LBS | SYSTOLIC BLOOD PRESSURE: 106 MMHG | OXYGEN SATURATION: 97 % | DIASTOLIC BLOOD PRESSURE: 71 MMHG | HEART RATE: 92 BPM | BODY MASS INDEX: 32.77 KG/M2

## 2025-03-03 DIAGNOSIS — S62.174D CLOSED NONDISPLACED FRACTURE OF TRAPEZIUM OF RIGHT WRIST WITH ROUTINE HEALING, SUBSEQUENT ENCOUNTER: Primary | ICD-10-CM

## 2025-03-03 PROCEDURE — 99024 POSTOP FOLLOW-UP VISIT: CPT | Performed by: PHYSICIAN ASSISTANT

## 2025-03-03 RX ORDER — LEVOTHYROXINE SODIUM 137 UG/1
137 TABLET ORAL DAILY
COMMUNITY
Start: 2025-03-03 | End: 2026-03-03

## 2025-03-07 ENCOUNTER — OFFICE VISIT (OUTPATIENT)
Dept: FAMILY MEDICINE CLINIC | Facility: CLINIC | Age: 40
End: 2025-03-07
Payer: COMMERCIAL

## 2025-03-07 VITALS
HEIGHT: 66 IN | OXYGEN SATURATION: 97 % | WEIGHT: 203 LBS | BODY MASS INDEX: 32.62 KG/M2 | SYSTOLIC BLOOD PRESSURE: 100 MMHG | HEART RATE: 84 BPM | DIASTOLIC BLOOD PRESSURE: 60 MMHG | TEMPERATURE: 97.6 F

## 2025-03-07 DIAGNOSIS — Z12.31 BREAST CANCER SCREENING BY MAMMOGRAM: Primary | ICD-10-CM

## 2025-03-07 DIAGNOSIS — E03.9 HYPOTHYROIDISM, UNSPECIFIED TYPE: ICD-10-CM

## 2025-03-07 DIAGNOSIS — I95.9 HYPOTENSION, UNSPECIFIED HYPOTENSION TYPE: ICD-10-CM

## 2025-03-07 DIAGNOSIS — L30.9 ECZEMA, UNSPECIFIED TYPE: ICD-10-CM

## 2025-03-07 PROBLEM — F41.9 ANXIETY: Status: RESOLVED | Noted: 2021-08-03 | Resolved: 2025-03-07
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
## 2025-03-31 ENCOUNTER — OFFICE VISIT (OUTPATIENT)
Dept: ORTHOPEDIC SURGERY | Facility: CLINIC | Age: 40
End: 2025-03-31
Payer: COMMERCIAL

## 2025-03-31 VITALS — HEIGHT: 66 IN | BODY MASS INDEX: 32.47 KG/M2 | WEIGHT: 202 LBS

## 2025-03-31 DIAGNOSIS — S62.174D CLOSED NONDISPLACED FRACTURE OF TRAPEZIUM OF RIGHT WRIST WITH ROUTINE HEALING, SUBSEQUENT ENCOUNTER: Primary | ICD-10-CM

## 2025-03-31 PROCEDURE — 99213 OFFICE O/P EST LOW 20 MIN: CPT | Performed by: PHYSICIAN ASSISTANT

## 2025-05-21 ENCOUNTER — HOSPITAL ENCOUNTER (OUTPATIENT)
Dept: MAMMOGRAPHY | Facility: HOSPITAL | Age: 40
Discharge: HOME | End: 2025-05-21
Admitting: FAMILY MEDICINE
Payer: COMMERCIAL

## 2025-05-21 DIAGNOSIS — Z12.31 BREAST CANCER SCREENING BY MAMMOGRAM: ICD-10-CM

## 2025-05-21 PROCEDURE — 77063 BREAST TOMOSYNTHESIS BI: CPT

## 2025-05-21 PROCEDURE — 77067 SCR MAMMO BI INCL CAD: CPT
